# Patient Record
Sex: FEMALE | Race: WHITE | Employment: UNEMPLOYED | URBAN - METROPOLITAN AREA
[De-identification: names, ages, dates, MRNs, and addresses within clinical notes are randomized per-mention and may not be internally consistent; named-entity substitution may affect disease eponyms.]

---

## 2017-08-19 ENCOUNTER — HOSPITAL ENCOUNTER (EMERGENCY)
Age: 32
Discharge: HOME | End: 2017-08-19
Attending: EMERGENCY MEDICINE
Payer: COMMERCIAL

## 2017-08-19 VITALS
HEART RATE: 78 BPM | SYSTOLIC BLOOD PRESSURE: 128 MMHG | OXYGEN SATURATION: 99 % | DIASTOLIC BLOOD PRESSURE: 72 MMHG | RESPIRATION RATE: 23 BRPM | WEIGHT: 276 LBS | TEMPERATURE: 98.6 F | HEIGHT: 67 IN | BODY MASS INDEX: 43.32 KG/M2

## 2017-08-19 DIAGNOSIS — T43.615A CAFFEINE ADVERSE REACTION, INITIAL ENCOUNTER: Primary | ICD-10-CM

## 2017-08-19 LAB
ANION GAP BLD CALC-SCNC: 15 MMOL/L (ref 5–15)
BUN BLD-MCNC: 17 MG/DL (ref 9–20)
CA-I BLD-MCNC: 1.11 MMOL/L (ref 1.12–1.32)
CHLORIDE BLD-SCNC: 105 MMOL/L (ref 98–107)
CO2 BLD-SCNC: 23 MMOL/L (ref 21–32)
CREAT BLD-MCNC: 0.6 MG/DL (ref 0.6–1.3)
GLUCOSE BLD-MCNC: 165 MG/DL (ref 65–100)
HCT VFR BLD CALC: 40 % (ref 35–47)
HGB BLD-MCNC: 13.6 GM/DL (ref 11.5–16)
POTASSIUM BLD-SCNC: 3.6 MMOL/L (ref 3.5–5.1)
SERVICE CMNT-IMP: ABNORMAL
SODIUM BLD-SCNC: 138 MMOL/L (ref 136–145)

## 2017-08-19 PROCEDURE — 80047 BASIC METABLC PNL IONIZED CA: CPT

## 2017-08-19 PROCEDURE — 74011250636 HC RX REV CODE- 250/636: Performed by: EMERGENCY MEDICINE

## 2017-08-19 PROCEDURE — 93005 ELECTROCARDIOGRAM TRACING: CPT

## 2017-08-19 PROCEDURE — 99284 EMERGENCY DEPT VISIT MOD MDM: CPT

## 2017-08-19 RX ADMIN — SODIUM CHLORIDE 1000 ML: 900 INJECTION, SOLUTION INTRAVENOUS at 08:51

## 2017-08-19 NOTE — ED TRIAGE NOTES
Pt presents to ED with c/o heart racing since yesterday. Pt states she drank two Monsters and has not felt well since. Pt states some indigestion but denies any chest pain at present.

## 2017-08-19 NOTE — LETTER
NOTIFICATION RETURN TO WORK / SCHOOL 
 
8/19/2017 9:11 AM 
 
Ms. Andrew Tyler [address]  [address] To Whom It May Concern: 
 
Andrew Tyler is currently under the care of [facility] EMERGENCY DEPT. Accompanied by her  She will return to work/school on: 8/19/17 If there are questions or concerns please have the patient contact our office. Sincerely, Xiomara Guo.  Rangel Morales MD

## 2017-08-19 NOTE — ED PROVIDER NOTES
HPI Comments: 33 y.o. female with no significant past medical history who presents from home with chief complaint of heart racing. Had 2 energy drinks last night and has had little sleep for the past 2 nights. States that she just does not feel right. Denies chest pain or SOB. Denies leg swelling, h/o DVT/PE, estrogen/ OCP use, recent surgeries. Denies nausea or vomiting. There are no other acute medical concerns at this time. Social hx: former smoker    PCP: Louie Page MD        The history is provided by the patient. Past Medical History:   Diagnosis Date    Anxiety     Depression     Fluid retention     Hypotension     Insulin resistance     Obstructive sleep apnea, adult     cpap    PCOS (polycystic ovarian syndrome)        Past Surgical History:   Procedure Laterality Date    HX DILATION AND CURETTAGE      s/p miscarriage 2004    HX GYN      IUD placement         Family History:   Problem Relation Age of Onset    Asthma Mother     Heart Disease Mother      Afib    Cancer Mother      colon    Asthma Father     Asthma Sister     Asthma Brother        Social History     Social History    Marital status:      Spouse name: N/A    Number of children: N/A    Years of education: N/A     Occupational History    Not on file. Social History Main Topics    Smoking status: Former Smoker    Smokeless tobacco: Never Used    Alcohol use Yes      Comment: Rarely used.  Drug use: No    Sexual activity: Yes     Partners: Male     Other Topics Concern    Not on file     Social History Narrative         ALLERGIES: Percocet [oxycodone-acetaminophen]    Review of Systems   Constitutional: Negative for chills and fever. HENT: Negative for ear pain and sore throat. Eyes: Negative for pain. Respiratory: Negative for chest tightness and shortness of breath. Cardiovascular: Negative for chest pain and leg swelling.    Gastrointestinal: Negative for abdominal pain, nausea and vomiting. Genitourinary: Negative for dysuria and flank pain. Musculoskeletal: Negative for back pain. Skin: Negative for rash. Neurological: Negative for headaches. All other systems reviewed and are negative. There were no vitals filed for this visit. Physical Exam   Constitutional: She is oriented to person, place, and time. She appears well-developed and well-nourished. HENT:   Head: Normocephalic and atraumatic. Mouth/Throat: Oropharynx is clear and moist.   Eyes: Conjunctivae and EOM are normal. Pupils are equal, round, and reactive to light. No scleral icterus. Neck: Neck supple. No tracheal deviation present. Cardiovascular: Normal rate, regular rhythm, normal heart sounds and intact distal pulses. Pulmonary/Chest: Effort normal and breath sounds normal. No respiratory distress. Abdominal: Soft. She exhibits no distension. There is no tenderness. There is no rebound and no guarding. Genitourinary:   Genitourinary Comments: deferred   Musculoskeletal: She exhibits no edema. Neurological: She is alert and oriented to person, place, and time. Skin: Skin is warm and dry. Psychiatric: She has a normal mood and affect. Nursing note and vitals reviewed. MDM  Number of Diagnoses or Management Options  Caffeine adverse reaction, initial encounter:   Diagnosis management comments: A/P: 33year old female presents with palpitations with diff dx of caffeine toxicity, electrolyte abnormality, anemia. Normal Vitals. - PERC rule negative  - Chem 8- normal electrolytes and hgb  - IVF  - EKG normal  - Follow up with PCP  - Return to the emergency department for new/ worsening symptoms or other concerns       ED Course     ED EKG interpretation:8:29 AM  Rhythm: normal sinus rhythm; and regular . Rate (approx.): 100;  Axis: normal; P wave: normal; QRS interval: normal ; ST/T wave: normal; Other findings: normal.       Procedures

## 2017-08-19 NOTE — DISCHARGE INSTRUCTIONS
Accidental Overdose of Medicine: Care Instructions  Your Care Instructions  Almost any medicine can cause harm if you take too much of it. You have had treatment to help your body get rid of an overdose of a medicine. This may have been an over-the-counter medicine. Or it might be one that a doctor prescribed. It may even have been a vitamin or a supplement. During treatment, the doctor may have given you fluids and medicine. You also may have had lab tests. Then the doctor made sure that you were well enough to go home. The doctor has checked you carefully, but problems can develop later. If you notice any problems or new symptoms, get medical treatment right away. Follow-up care is a key part of your treatment and safety. Be sure to make and go to all appointments, and call your doctor if you are having problems. It's also a good idea to know your test results and keep a list of the medicines you take. How can you care for yourself at home? Home care  · Drink plenty of fluids. If you have kidney, heart, or liver disease and have to limit fluids, talk with your doctor before you increase the amount of fluids you drink. · If you normally take medicines, ask your doctor when you can start taking them again. · Read the information that comes with any medicine. If you have questions, ask your doctor or pharmacist.  Prevention  · Be safe with medicines. Take your medicines exactly as prescribed or as the label directs. Call your doctor if you think you are having a problem with your medicine. · Keep your medicines in the containers they came in. Keep them with the original labels. · Find out what to do if you miss a dose of your medicine. When should you call for help? Call 911 anytime you think you may need emergency care. For example, call if:  · You passed out (lost consciousness). · You have trouble breathing. · You are sleepy or hard to wake up.   Call your doctor now or seek immediate medical care if:  · You are vomiting. · You have a new or worse headache. · You are dizzy or lightheaded or feel like you may faint. Watch closely for changes in your health, and be sure to contact your doctor if:  · You do not get better as expected. Where can you learn more? Go to http://alisha-ximena.info/. Enter C165 in the search box to learn more about \"Accidental Overdose of Medicine: Care Instructions. \"  Current as of: March 24, 2017  Content Version: 11.3  © 1215-6687 [employer]. Care instructions adapted under license by [employer] (which disclaims liability or warranty for this information). If you have questions about a medical condition or this instruction, always ask your healthcare professional. [address] any warranty or liability for your use of this information. We hope that we have addressed all of your medical concerns. The examination and treatment you received in the Emergency Department were for an emergent problem and were not intended as complete care. It is important that you follow up with your healthcare provider(s) for ongoing care. If your symptoms worsen or do not improve as expected, and you are unable to reach your usual health care provider(s), you should return to the Emergency Department. Today's healthcare is undergoing tremendous change, and patient satisfaction surveys are one of the many tools to assess the quality of medical care. You may receive a survey from the [employer] regarding your experience in the Emergency Department. I hope that your experience has been completely positive, particularly the medical care that I provided. As such, please participate in the survey; anything less than excellent does not meet my expectations or intentions. [facility] and 8 [facility] participate in nationally recognized quality of care measures.   If your blood pressure is greater than 120/80, as reported below, we urge that you seek medical care to address the potential of high blood pressure, commonly known as hypertension. Hypertension can be hereditary or can be caused by certain medical conditions, pain, stress, or \"white coat syndrome. \"       Please make an appointment with your health care provider(s) for follow up of your Emergency Department visit. VITALS:   Patient Vitals for the past 8 hrs:   Temp Pulse Resp BP SpO2   08/19/17 0828 98.6 °F (37 °C) 78 15 134/73 99 %          Thank you for allowing us to provide you with medical care today. We realize that you have many choices for your emergency care needs. Please choose us in the future for any continued health care needs. Godfrey Yadav, [address] [place] [address] 20.   Office: [phone]            Recent Results (from the past 24 hour(s))   EKG, 12 LEAD, INITIAL    Collection Time: 08/19/17  8:27 AM   Result Value Ref Range    Ventricular Rate 100 BPM    Atrial Rate 100 BPM    P-R Interval 164 ms    QRS Duration 88 ms    Q-T Interval 364 ms    QTC Calculation (Bezet) 469 ms    Calculated P Axis 42 degrees    Calculated R Axis -10 degrees    Calculated T Axis 3 degrees    Diagnosis       Normal sinus rhythm  Normal ECG  When compared with ECG of 03-JUL-2016 18:07,  No significant change was found     POC CHEM8    Collection Time: 08/19/17  8:49 AM   Result Value Ref Range    Calcium, ionized (POC) 1.11 (L) 1.12 - 1.32 MMOL/L    Sodium (POC) 138 136 - 145 MMOL/L    Potassium (POC) 3.6 3.5 - 5.1 MMOL/L    Chloride (POC) 105 98 - 107 MMOL/L    CO2 (POC) 23 21 - 32 MMOL/L    Anion gap (POC) 15 5 - 15 mmol/L    Glucose (POC) 165 (H) 65 - 100 MG/DL    BUN (POC) 17 9 - 20 MG/DL    Creatinine (POC) 0.6 0.6 - 1.3 MG/DL    GFRAA, POC >60 >60 ml/min/1.73m2    GFRNA, POC >60 >60 ml/min/1.73m2    Hemoglobin (POC) 13.6 11.5 - 16.0 GM/DL    Hematocrit (POC) 40 35.0 - 47.0 %    Comment Comment Not Indicated. No results found.

## 2017-08-20 LAB
ATRIAL RATE: 100 BPM
CALCULATED P AXIS, ECG09: 42 DEGREES
CALCULATED R AXIS, ECG10: -10 DEGREES
CALCULATED T AXIS, ECG11: 3 DEGREES
DIAGNOSIS, 93000: NORMAL
P-R INTERVAL, ECG05: 164 MS
Q-T INTERVAL, ECG07: 364 MS
QRS DURATION, ECG06: 88 MS
QTC CALCULATION (BEZET), ECG08: 469 MS
VENTRICULAR RATE, ECG03: 100 BPM

## 2018-03-22 ENCOUNTER — OFFICE VISIT (OUTPATIENT)
Dept: SURGERY | Age: 33
End: 2018-03-22

## 2018-03-22 DIAGNOSIS — E66.01 MORBID OBESITY WITH BMI OF 40.0-44.9, ADULT (HCC): Primary | ICD-10-CM

## 2018-03-22 DIAGNOSIS — E28.2 PCOS (POLYCYSTIC OVARIAN SYNDROME): ICD-10-CM

## 2018-03-22 RX ORDER — ACYCLOVIR 400 MG/1
TABLET ORAL
COMMUNITY
Start: 2017-04-15 | End: 2019-08-18

## 2018-03-22 RX ORDER — ALBUTEROL SULFATE 90 UG/1
AEROSOL, METERED RESPIRATORY (INHALATION)
COMMUNITY
End: 2019-08-18

## 2018-03-22 RX ORDER — FUROSEMIDE 20 MG/1
TABLET ORAL
COMMUNITY

## 2018-03-22 NOTE — MR AVS SNAPSHOT
[address] [place] [facility] [address] 
[phone] Patient: Carolyne Bloch MRN: [id number] :1985 Visit Information Date & Time Provider Department Dept. Phone Encounter #  
 3/22/2018  2:00 PM Rojelio Rush NP [facility] 22 [phone] [id number] Upcoming Health Maintenance Date Due DTaP/Tdap/Td series (1 - Tdap) 10/27/2004 PAP AKA CERVICAL CYTOLOGY 10/27/2004 Influenza Age 5 to Adult 2017 Allergies as of 3/22/2018  Review Complete On: 3/22/2018 By: Rojelio Rush NP Severity Noted Reaction Type Reactions Ciprofloxacin High 2018    Anaphylaxis Percocet [Oxycodone-acetaminophen]  2013    Itching Prozac [Fluoxetine]  2018    Other (comments) BP goes high and migraine for 36 hours. Current Immunizations  Never Reviewed No immunizations on file. Not reviewed this visit You Were Diagnosed With   
  
 Codes Comments Morbid obesity with BMI of 40.0-44.9, adult ([facility]    -  Primary ICD-10-CM: E66.01, Z68.41 
ICD-9-CM: 278.01, V85.41 Vitals BP Pulse Temp Resp Height(growth percentile) Weight(growth percentile) 116/70 78 98.8 °F (37.1 °C) (Oral) 20 5' 7\" (1.702 m) 286 lb 12.8 oz (130.1 kg) SpO2 BMI OB Status Smoking Status 98% 44.92 kg/m2 Having regular periods Former Smoker Vitals History BMI and BSA Data Body Mass Index Body Surface Area 44.92 kg/m 2 2.48 m 2 Preferred Pharmacy Pharmacy Name Phone [address], [address] [phone] Your Updated Medication List  
  
   
This list is accurate as of 3/22/18  3:28 PM.  Always use your most recent med list.  
  
  
  
  
 acyclovir 400 mg tablet Commonly known as:  ZOVIRAX  
  
 albuterol 90 mcg/actuation inhaler Commonly known as:  PROVENTIL HFA, VENTOLIN HFA, PROAIR HFA  
 Take  by inhalation. biotin 2,500 mcg Tab Take 5,000 mg by mouth. EFFEXOR XR PO Take 150 mg by mouth. furosemide 20 mg tablet Commonly known as:  LASIX Take 20 mg by mouth daily. metFORMIN 1,000 mg tablet Commonly known as:  GLUCOPHAGE Take 1,000 mg by mouth two (2) times daily (with meals). POTASSIUM PO Take 10 mEq by mouth. spironolactone 50 mg tablet Commonly known as:  ALDACTONE Take  by mouth daily. traZODone 50 mg tablet Commonly known as:  Wendy Artis Patient Instructions Shanon Christianson will contact you about the dietician You can schedule the psychological evaluation at any time Learning About How to Prepare for [address] [place] [address] Surgery How can you prepare for weight-loss surgery? Having weight-loss surgery (also called bariatric surgery) is a big step. You can prepare for surgery by having a plan. Your plan may include your goals for losing weight and how to makes changes in your diet, activity, and lifestyle to help raise your chances of success. One way to prepare for surgery is to think about your goal or reason why you want to reach a healthy weight. Do you want to lower your blood pressure, cholesterol, or blood sugar? Do you want to be able to sleep better, play with your kids, or walk around the block? Having a reason can help you stay with your plan and meet your goals. Your weight-loss surgery team can help you meet your goals and get ready for surgery. Bonnie Toledo work with a team that's trained to help you lose weight and make healthy changes in your life. This team may include: · A medical doctor or nurse to help manage your care and schedule tests before surgery. · A surgeon who specializes in weight-loss surgery. · A registered dietitian to help you plan meals and make changes in the way you eat. · An exercise specialist to help you be more active and get stronger. · A therapist or counselor to help you learn why you eat and teach you ways to deal with stress and your emotions. Your team will also be there to help you prepare for life after surgery. They will help you adjust to new ways of eating and changes to your body. How will weight-loss surgery affect your life? You have likely thought a lot about how surgery may affect your life-how you will eat, how your body will look, or how you will feel. Some people feel overwhelmed with these changes. But planning can help you prepare for the changes and meet your weight-loss goals. One important step in your plan is to learn about the ways surgery will affect your life. These may include: · A slimmer you. You probably will lose weight very quickly in the first few months after surgery. As time goes on, your weight loss will slow down. How much weight you lose depends on what type of surgery you had and how well your new eating and activity plans are working for you. · A new way of eating. Success in reaching and keeping a healthy weight depends on making lifelong changes in how you eat. After surgery, you raise your chances of success if you: 
¨ Eat just a few ounces of food at a time. ¨ Eat very slowly and chew your food to mush. ¨ Don't drink for 30 minutes before you eat, during your meal, and for 30 minutes after you eat. ¨ Are careful about drinking alcohol. ¨ Avoid foods that are high in fat or sugar. ¨ Take vitamin and mineral supplements. · A healthier you. Weight-loss surgery can have some real health benefits. Problems like diabetes, high blood pressure, and sleep apnea may go away-or at least become easier to manage. · A more active you. After surgery, being active on most days of the week will help you reach your weight goal and avoid gaining back the weight you lose. · A lot of extra skin.  When you lose weight quickly, you may have a lot of extra skin. That's normal. You can have surgery to remove the extra skin if it bothers you. There are going to be some ups and downs while you get used to these changes. So another way to adjust is to identify who can help support you. Getting support from friends and family can help. And joining a support group for people who have had the surgery can be a big help too, because they know what you're going through. As you know, it's a big decision to have weight-loss surgery. But when you have a plan, you can focus on losing weight and living a healthier life. So what steps can you take to prepare for weight-loss surgery? Will you set some goals? Will you learn about how surgery can affect your life? How about asking family or friends for help? Write out your plan. Then get ready. Where can you learn more? Go to http://alishaZeenshareximena.info/. Enter B133 in the search box to learn more about \"Learning About How to Prepare for Weight-Loss Surgery. \" Current as of: October 13, 2016 Content Version: 11.4 © 4168-4878 [employer]. Care instructions adapted under license by [employer] (which disclaims liability or warranty for this information). If you have questions about a medical condition or this instruction, always ask your healthcare professional. [address] any warranty or liability for your use of this information. Introducing [facility] [place] [facility]! Dear Rigo Range: Thank you for requesting a GROU.PS account. Our records indicate that you already have an active GROU.PS account. You can access your account anytime at [email or web address]. [employer] Did you know that you can access your hospital and ER discharge instructions at any time in GROU.PS? You can also review all of your test results from your hospital stay or ER visit. Additional Information If you have questions, please visit the Frequently Asked Questions section of the MovieLaLa website at [email or web address]. The Smartphone Physical/mychart/. Remember, MovieLaLa is NOT to be used for urgent needs. For medical emergencies, dial 911. Now available from your iPhone and Android! Please provide this summary of care documentation to your next provider. Your primary care clinician is listed as Gustavo Nair. If you have any questions after today's visit, please call [phone].

## 2018-03-22 NOTE — PATIENT INSTRUCTIONS
Lurdes Vines will contact you about the dietician     You can schedule the psychological evaluation at any time          Learning About How to Prepare for Weight-Loss Surgery  How can you prepare for weight-loss surgery? Having weight-loss surgery (also called bariatric surgery) is a big step. You can prepare for surgery by having a plan. Your plan may include your goals for losing weight and how to makes changes in your diet, activity, and lifestyle to help raise your chances of success. One way to prepare for surgery is to think about your goal or reason why you want to reach a healthy weight. Do you want to lower your blood pressure, cholesterol, or blood sugar? Do you want to be able to sleep better, play with your kids, or walk around the block? Having a reason can help you stay with your plan and meet your goals. Your weight-loss surgery team can help you meet your goals and get ready for surgery. Hank Kulkarni work with a team that's trained to help you lose weight and make healthy changes in your life. This team may include:  · A medical doctor or nurse to help manage your care and schedule tests before surgery. · A surgeon who specializes in weight-loss surgery. · A registered dietitian to help you plan meals and make changes in the way you eat. · An exercise specialist to help you be more active and get stronger. · A therapist or counselor to help you learn why you eat and teach you ways to deal with stress and your emotions. Your team will also be there to help you prepare for life after surgery. They will help you adjust to new ways of eating and changes to your body. How will weight-loss surgery affect your life? You have likely thought a lot about how surgery may affect your life-how you will eat, how your body will look, or how you will feel. Some people feel overwhelmed with these changes. But planning can help you prepare for the changes and meet your weight-loss goals.   One important step in your plan is to learn about the ways surgery will affect your life. These may include:  · A slimmer you. You probably will lose weight very quickly in the first few months after surgery. As time goes on, your weight loss will slow down. How much weight you lose depends on what type of surgery you had and how well your new eating and activity plans are working for you. · A new way of eating. Success in reaching and keeping a healthy weight depends on making lifelong changes in how you eat. After surgery, you raise your chances of success if you:  ¨ Eat just a few ounces of food at a time. ¨ Eat very slowly and chew your food to mush. ¨ Don't drink for 30 minutes before you eat, during your meal, and for 30 minutes after you eat. ¨ Are careful about drinking alcohol. ¨ Avoid foods that are high in fat or sugar. ¨ Take vitamin and mineral supplements. · A healthier you. Weight-loss surgery can have some real health benefits. Problems like diabetes, high blood pressure, and sleep apnea may go away-or at least become easier to manage. · A more active you. After surgery, being active on most days of the week will help you reach your weight goal and avoid gaining back the weight you lose. · A lot of extra skin. When you lose weight quickly, you may have a lot of extra skin. That's normal. You can have surgery to remove the extra skin if it bothers you. There are going to be some ups and downs while you get used to these changes. So another way to adjust is to identify who can help support you. Getting support from friends and family can help. And joining a support group for people who have had the surgery can be a big help too, because they know what you're going through. As you know, it's a big decision to have weight-loss surgery. But when you have a plan, you can focus on losing weight and living a healthier life. So what steps can you take to prepare for weight-loss surgery? Will you set some goals?  Will you learn about how surgery can affect your life? How about asking family or friends for help? Write out your plan. Then get ready. Where can you learn more? Go to http://alisha-ximena.info/. Enter G634 in the search box to learn more about \"Learning About How to Prepare for Weight-Loss Surgery. \"  Current as of: October 13, 2016  Content Version: 11.4  © 8663-6488 [employer]wise, [employer]. Care instructions adapted under license by [employer] (which disclaims liability or warranty for this information). If you have questions about a medical condition or this instruction, always ask your healthcare professional. [address] any warranty or liability for your use of this information.

## 2018-03-22 NOTE — PROGRESS NOTES
1. Have you been to the ER, urgent care clinic since your last visit? Hospitalized since your last visit? No    2. Have you seen or consulted any other health care providers outside of the [address] [place] [address] since your last visit? Include any pap smears or colon screening. No       Obdulia Shaffer  Body composition    female  27 y.o. Vitals:    03/22/18 1410   Weight: 286 lb 12.8 oz (130.1 kg)   Height: 5' 7\" (1.702 m)     Body mass index is 44.92 kg/(m^2). Zoila Frisco Neck- 16 inches  Waist-49.5 inches  Hips-60 inches  Frame size-6 medium

## 2018-03-22 NOTE — PROGRESS NOTES
HISTORY OF PRESENT ILLNESS  Haley Bucio is a 27 y.o. female. HPI   Chief Complaint   Patient presents with    Morbid Obesity     Bariatric patient who wants to discuss gastric bypass surgery by Dr. Celine Branham. Haley Bucio is a 27 y.o. female that presents today for evaluation for weight loss surgery. Patient has been overweight most of her life and worsened in high school with the PCOS. High weight 309 lbs last year and adult low weight 250 lbs at [facility] graduation. She has attended an informational seminar and viewed the on - line seminar. She would like to be considered for Malabsorptive gastric bypass for treatment of morbid obesity with Jered Sosa MD.       Body mass index is 47.93 kg/(m^2).       Dietary Habits: 3 meals per day, struggles with hunger; drinks mostly water with crystal lite; eats out 2-3x/ week (recently cut back)     Prior weight loss attempts: Keto diet and lost 35 lbs, Atmary, My Fitness Pal, Belviq, phentermine   Patient Active Problem List   Diagnosis Code    Obesity, morbid ([facility] [address] E66.01    PCOS (polycystic ovarian syndrome) E28.2     Past Medical History:   Diagnosis Date    Anxiety     Asthma     Depression     Fluid retention     GERD (gastroesophageal reflux disease)     Hypotension     Insulin resistance     Morbid obesity (HCC)     Obstructive sleep apnea, adult     cpap    PCOS (polycystic ovarian syndrome)      Past Surgical History:   Procedure Laterality Date    HX DILATION AND CURETTAGE      s/p miscarriage 2004    HX GYN      IUD placement    HX HEENT      wisdom teeth    HX KNEE ARTHROSCOPY Bilateral      Social History     Social History    Marital status:      Spouse name: N/A    Number of children: 4    Years of education: N/A     Occupational History    corrections       Social History Main Topics    Smoking status: Former Smoker     Types: Cigarettes     Quit date: 2010    Smokeless tobacco: Never Used    Alcohol use Yes Comment: Rarely used.  Drug use: No    Sexual activity: Yes     Partners: Male     Birth control/ protection: Surgical      Comment: vasectomy      Other Topics Concern    Not on file     Social History Narrative    In the home with 4 children ages 13-11     Spouse in the home     All well      Family History   Problem Relation Age of Onset    Asthma Mother     Heart Disease Mother      Afib    Cancer Mother      colon    Asthma Father     Asthma Sister     Asthma Brother     Headache Paternal Uncle     Diabetes Maternal Grandfather     Stroke Maternal Grandfather     Diabetes Paternal Alona Gregory     Stroke Paternal Grandfather        Current Outpatient Prescriptions:     albuterol (PROVENTIL HFA, VENTOLIN HFA, PROAIR HFA) 90 mcg/actuation inhaler, Take  by inhalation. , Disp: , Rfl:     furosemide (LASIX) 20 mg tablet, Take 20 mg by mouth daily. , Disp: , Rfl:     acyclovir (ZOVIRAX) 400 mg tablet, , Disp: , Rfl:     metFORMIN (GLUCOPHAGE) 1,000 mg tablet, Take 1,000 mg by mouth two (2) times daily (with meals). , Disp: , Rfl:     spironolactone (ALDACTONE) 50 mg tablet, Take  by mouth daily. , Disp: , Rfl:     biotin 2,500 mcg tab, Take 5,000 mg by mouth., Disp: , Rfl:     POTASSIUM PO, Take 10 mEq by mouth., Disp: , Rfl:     VENLAFAXINE HCL (EFFEXOR XR PO), Take 150 mg by mouth., Disp: , Rfl:     traZODone (DESYREL) 50 mg tablet, , Disp: , Rfl:   Allergies   Allergen Reactions    Ciprofloxacin Anaphylaxis    Percocet [Oxycodone-Acetaminophen] Itching    Prozac [Fluoxetine] Other (comments)     BP goes high and migraine for 36 hours. PCP Lorena Marquez MD      Review of Systems   Constitutional: Positive for malaise/fatigue (push myself to get through the day ). \"I'm all or none\"   HENT: Positive for congestion (seasonal allergies ). Negative for ear discharge, hearing loss, sinus pain, sore throat and tinnitus. Eyes: Positive for blurred vision (contacts ).    Respiratory: Positive for shortness of breath and wheezing (hx asthma with recent flair ). Negative for cough, hemoptysis and sputum production.         + ANNA and uses CPAP      Cardiovascular: Positive for leg swelling (better with lasix ). Negative for chest pain and palpitations. Hx cardiogenic hypotension      Gastrointestinal: Positive for constipation (hemorrhoids ) and heartburn (breakthrough with zantac). Negative for abdominal pain, blood in stool, diarrhea, melena, nausea and vomiting. Genitourinary: Positive for urgency. Negative for dysuria, flank pain, frequency and hematuria. Musculoskeletal: Positive for back pain (no sciatica ), joint pain (knees), myalgias and neck pain (muscles ). Negative for falls. Torn meniscus bilateral knees and was told needs knee replacements    Skin:        Eczema   rosacea    Neurological: Positive for tingling (fingers ), seizures (febrile seizure as child ) and headaches (weekly ). Negative for dizziness. Endo/Heme/Allergies: Does not bruise/bleed easily. PCOS      Psychiatric/Behavioral: Positive for depression. The patient does not have insomnia. Physical Exam   Constitutional: She is oriented to person, place, and time. /70  Pulse 78  Temp 98.8 °F (37.1 °C) (Oral)   Resp 20  Ht 5' 7\" (1.702 m)  Wt 306 lb (138.8 kg)  SpO2 98%  BMI 47.93 kg/m2    White female with central obesity    Cardiovascular: Normal rate and regular rhythm. Pulmonary/Chest: Effort normal and breath sounds normal.   Abdominal: Soft. Obese    Musculoskeletal:   Ambulating independently    Neurological: She is alert and oriented to person, place, and time. Psychiatric: She has a normal mood and affect. ASSESSMENT and PLAN    ICD-10-CM ICD-9-CM    1. Morbid obesity with BMI of 40.0-44.9, adult ([facility] E66.01 278.01     Z68.41 V85.41    2. PCOS (polycystic ovarian syndrome) E28.2 256.4      Obdulia Shaffer meets criteria established by the NIH.  Without weight reduction, co-morbidities will escalate as well as risk of early mortality. Recommendation is patient could be served with surgical weight reduction, the procedure of Malabsorptive gastric bypass for treatment of morbid obesity . I explained to the patient differences between laparoscopic  gastric bypass and sleeve gastrectomy procedures. Patient has attended one our informational meeting and has seen our educational materials. Patient desires to have surgery with Syed Caro MD.   The procedure of divided gastric bypass with Randy-en-Y gastrojejunostomy was explained to the patient including the four parts of the operation- 1) loss of appetite, 2) the restrictive phases, 3) the dumping phase, 4) mild malabsorption from the diversion of pancreatic or biliary enzymes. Informed consent was obtained concerning the potential morbidities and mortality of the operation including anastomotic leak, pulmonary embolism, deep vein thrombosis, bleeding, staple- line disruption, stomal stenosis or dilatation, inadequate weight loss, and requirement for life-long vitamin intake. Further information was given concerning a three-day hospitalization with at least one or more nights in a special care unit, protein- enriched liquified diet for two-thee weeks, convalescence for three to six weeks. Information was provided concerning the team approach anesthesia, nursing, and dietary. Pneumatic stockings, Heparin or Lovenox, and wound care management techniques were explained. Abdominal pain, nausea and/or vomiting may occur if eating too fast, too much, or not chewing well enough. With sweets or high fat ingestion, dumping may occur. It was further stressed the approximately three to five percent of patients require endoscopic balloon dilatation of the staple line.  Furthermore, a clear discussion of the imperfections of the operation was discussed including the fact that it not effective in every patient because of patient non-compliance and/or mechanical failure. It was hoped, however, that at approaching a ninety percent level, the patients can achieve a mean of seventy percent loss of excess body weight. This is determined partially by patient compliance and exercise as well as making wise choices for the diet. Recommendations:    _x__ Nutrition Evaluation    _x__ Psychological Evaluation    ___ Cardiac Evaluation    ___ Pulmonary Evaluation    ___ Sleep Medicine     ___ [facility]     ___ Committee    I have reinforced without lifestyle change and behavior modification, Ketty Oviedo would not achieve her weight loss goals. I reviewed risks and complications associated with each procedure. I discussed with patient a diet high in protein, low-fat, low- sugar, limited carbohydrates, and discontinuing use of carbonated beverages. Also discussed physical activity and exercises. I have answered all questions, they wish to proceed. Ketty Oviedo verbalized understanding and questions were answered to the best of my knowledge and ability. Bariatric surgery  educational materials were provided. 56 minutes spent in face to face with Obdulia Shaffer > 50% counseling.       ** Copy to PCP Ashley Leslie MD

## 2018-03-23 ENCOUNTER — TELEPHONE (OUTPATIENT)
Dept: SURGERY | Age: 33
End: 2018-03-23

## 2018-03-23 VITALS
HEART RATE: 78 BPM | DIASTOLIC BLOOD PRESSURE: 70 MMHG | WEIGHT: 293 LBS | RESPIRATION RATE: 20 BRPM | OXYGEN SATURATION: 98 % | SYSTOLIC BLOOD PRESSURE: 116 MMHG | BODY MASS INDEX: 45.99 KG/M2 | HEIGHT: 67 IN | TEMPERATURE: 98.8 F

## 2018-03-23 PROBLEM — E28.2 PCOS (POLYCYSTIC OVARIAN SYNDROME): Status: ACTIVE | Noted: 2018-03-23

## 2019-08-18 ENCOUNTER — HOSPITAL ENCOUNTER (EMERGENCY)
Age: 34
Discharge: HOME | End: 2019-08-18
Attending: EMERGENCY MEDICINE
Payer: COMMERCIAL

## 2019-08-18 ENCOUNTER — APPOINTMENT (OUTPATIENT)
Dept: ULTRASOUND IMAGING | Age: 34
End: 2019-08-18
Attending: EMERGENCY MEDICINE
Payer: COMMERCIAL

## 2019-08-18 ENCOUNTER — APPOINTMENT (OUTPATIENT)
Dept: CT IMAGING | Age: 34
End: 2019-08-18
Attending: EMERGENCY MEDICINE
Payer: COMMERCIAL

## 2019-08-18 ENCOUNTER — HOSPITAL ENCOUNTER (EMERGENCY)
Age: 34
Discharge: HOME | End: 2019-08-19
Attending: EMERGENCY MEDICINE
Payer: COMMERCIAL

## 2019-08-18 VITALS
TEMPERATURE: 97.8 F | OXYGEN SATURATION: 99 % | BODY MASS INDEX: 45.99 KG/M2 | HEIGHT: 67 IN | SYSTOLIC BLOOD PRESSURE: 111 MMHG | DIASTOLIC BLOOD PRESSURE: 59 MMHG | WEIGHT: 293 LBS | RESPIRATION RATE: 16 BRPM | HEART RATE: 70 BPM

## 2019-08-18 DIAGNOSIS — K29.90 GASTRITIS AND DUODENITIS: ICD-10-CM

## 2019-08-18 DIAGNOSIS — R10.13 ABDOMINAL PAIN, EPIGASTRIC: Primary | ICD-10-CM

## 2019-08-18 DIAGNOSIS — M54.50 ACUTE BILATERAL LOW BACK PAIN WITHOUT SCIATICA: ICD-10-CM

## 2019-08-18 DIAGNOSIS — M54.50 ACUTE BILATERAL LOW BACK PAIN WITHOUT SCIATICA: Primary | ICD-10-CM

## 2019-08-18 LAB
ALBUMIN SERPL-MCNC: 3.8 G/DL (ref 3.5–5)
ALBUMIN/GLOB SERPL: 1 {RATIO} (ref 1.1–2.2)
ALP SERPL-CCNC: 82 U/L (ref 45–117)
ALT SERPL-CCNC: 16 U/L (ref 12–78)
AMORPH CRY URNS QL MICRO: ABNORMAL
ANION GAP SERPL CALC-SCNC: 9 MMOL/L (ref 5–15)
APPEARANCE UR: CLEAR
AST SERPL-CCNC: 9 U/L (ref 15–37)
BACTERIA URNS QL MICRO: NEGATIVE /HPF
BASOPHILS # BLD: 0 K/UL (ref 0–0.1)
BASOPHILS NFR BLD: 0 % (ref 0–1)
BILIRUB SERPL-MCNC: 0.5 MG/DL (ref 0.2–1)
BILIRUB UR QL: NEGATIVE
BUN SERPL-MCNC: 14 MG/DL (ref 6–20)
BUN/CREAT SERPL: 17 (ref 12–20)
CALCIUM SERPL-MCNC: 9 MG/DL (ref 8.5–10.1)
CHLORIDE SERPL-SCNC: 106 MMOL/L (ref 97–108)
CO2 SERPL-SCNC: 27 MMOL/L (ref 21–32)
COLOR UR: ABNORMAL
CREAT SERPL-MCNC: 0.81 MG/DL (ref 0.55–1.02)
DIFFERENTIAL METHOD BLD: NORMAL
EOSINOPHIL # BLD: 0.1 K/UL (ref 0–0.4)
EOSINOPHIL NFR BLD: 1 % (ref 0–7)
EPITH CASTS URNS QL MICRO: ABNORMAL /LPF
ERYTHROCYTE [DISTWIDTH] IN BLOOD BY AUTOMATED COUNT: 12.4 % (ref 11.5–14.5)
GLOBULIN SER CALC-MCNC: 3.8 G/DL (ref 2–4)
GLUCOSE SERPL-MCNC: 107 MG/DL (ref 65–100)
GLUCOSE UR STRIP.AUTO-MCNC: NEGATIVE MG/DL
HCG UR QL: NEGATIVE
HCT VFR BLD AUTO: 39.1 % (ref 35–47)
HGB BLD-MCNC: 13.5 G/DL (ref 11.5–16)
HGB UR QL STRIP: ABNORMAL
KETONES UR QL STRIP.AUTO: NEGATIVE MG/DL
LEUKOCYTE ESTERASE UR QL STRIP.AUTO: NEGATIVE
LIPASE SERPL-CCNC: 133 U/L (ref 73–393)
LYMPHOCYTES # BLD: 1.9 K/UL (ref 0.8–3.5)
LYMPHOCYTES NFR BLD: 20 % (ref 12–49)
MCH RBC QN AUTO: 30.9 PG (ref 26–34)
MCHC RBC AUTO-ENTMCNC: 34.5 G/DL (ref 30–36.5)
MCV RBC AUTO: 89.5 FL (ref 80–99)
MONOCYTES # BLD: 0.7 K/UL (ref 0–1)
MONOCYTES NFR BLD: 8 % (ref 5–13)
NEUTS SEG # BLD: 6.5 K/UL (ref 1.8–8)
NEUTS SEG NFR BLD: 71 % (ref 32–75)
NITRITE UR QL STRIP.AUTO: NEGATIVE
PH UR STRIP: 8 [PH] (ref 5–8)
PLATELET # BLD AUTO: 253 K/UL (ref 150–400)
PMV BLD AUTO: 10.3 FL (ref 8.9–12.9)
POTASSIUM SERPL-SCNC: 4.2 MMOL/L (ref 3.5–5.1)
PROT SERPL-MCNC: 7.6 G/DL (ref 6.4–8.2)
PROT UR STRIP-MCNC: NEGATIVE MG/DL
RBC # BLD AUTO: 4.37 M/UL (ref 3.8–5.2)
RBC #/AREA URNS HPF: ABNORMAL /HPF (ref 0–5)
SODIUM SERPL-SCNC: 142 MMOL/L (ref 136–145)
SP GR UR REFRACTOMETRY: 1.02 (ref 1–1.03)
UR CULT HOLD, URHOLD: NORMAL
UROBILINOGEN UR QL STRIP.AUTO: 0.2 EU/DL (ref 0.2–1)
WBC # BLD AUTO: 9.2 K/UL (ref 3.6–11)
WBC URNS QL MICRO: ABNORMAL /HPF (ref 0–4)

## 2019-08-18 PROCEDURE — 96361 HYDRATE IV INFUSION ADD-ON: CPT

## 2019-08-18 PROCEDURE — 74176 CT ABD & PELVIS W/O CONTRAST: CPT

## 2019-08-18 PROCEDURE — 96375 TX/PRO/DX INJ NEW DRUG ADDON: CPT

## 2019-08-18 PROCEDURE — 81001 URINALYSIS AUTO W/SCOPE: CPT

## 2019-08-18 PROCEDURE — 99283 EMERGENCY DEPT VISIT LOW MDM: CPT

## 2019-08-18 PROCEDURE — 80053 COMPREHEN METABOLIC PANEL: CPT

## 2019-08-18 PROCEDURE — 96372 THER/PROPH/DIAG INJ SC/IM: CPT

## 2019-08-18 PROCEDURE — 74011000250 HC RX REV CODE- 250: Performed by: EMERGENCY MEDICINE

## 2019-08-18 PROCEDURE — 74011250637 HC RX REV CODE- 250/637: Performed by: EMERGENCY MEDICINE

## 2019-08-18 PROCEDURE — 74011250636 HC RX REV CODE- 250/636: Performed by: EMERGENCY MEDICINE

## 2019-08-18 PROCEDURE — 36415 COLL VENOUS BLD VENIPUNCTURE: CPT

## 2019-08-18 PROCEDURE — 96374 THER/PROPH/DIAG INJ IV PUSH: CPT

## 2019-08-18 PROCEDURE — 85025 COMPLETE CBC W/AUTO DIFF WBC: CPT

## 2019-08-18 PROCEDURE — 99284 EMERGENCY DEPT VISIT MOD MDM: CPT

## 2019-08-18 PROCEDURE — 81025 URINE PREGNANCY TEST: CPT

## 2019-08-18 PROCEDURE — 83690 ASSAY OF LIPASE: CPT

## 2019-08-18 PROCEDURE — 76705 ECHO EXAM OF ABDOMEN: CPT

## 2019-08-18 PROCEDURE — 74011636637 HC RX REV CODE- 636/637: Performed by: EMERGENCY MEDICINE

## 2019-08-18 RX ORDER — ONDANSETRON 4 MG/1
4 TABLET, ORALLY DISINTEGRATING ORAL
Status: COMPLETED | OUTPATIENT
Start: 2019-08-18 | End: 2019-08-18

## 2019-08-18 RX ORDER — PANTOPRAZOLE SODIUM 40 MG/1
40 TABLET, DELAYED RELEASE ORAL DAILY
Qty: 30 TAB | Refills: 0 | Status: SHIPPED | OUTPATIENT
Start: 2019-08-18 | End: 2019-09-17

## 2019-08-18 RX ORDER — PROCHLORPERAZINE EDISYLATE 5 MG/ML
10 INJECTION INTRAMUSCULAR; INTRAVENOUS
Status: COMPLETED | OUTPATIENT
Start: 2019-08-18 | End: 2019-08-18

## 2019-08-18 RX ORDER — LIDOCAINE 4 G/100G
1 PATCH TOPICAL
Status: DISCONTINUED | OUTPATIENT
Start: 2019-08-18 | End: 2019-08-18 | Stop reason: HOSPADM

## 2019-08-18 RX ORDER — HYDROCODONE BITARTRATE AND ACETAMINOPHEN 5; 325 MG/1; MG/1
1 TABLET ORAL
Qty: 10 TAB | Refills: 0 | Status: SHIPPED | OUTPATIENT
Start: 2019-08-18 | End: 2019-08-23

## 2019-08-18 RX ORDER — MORPHINE SULFATE 2 MG/ML
2 INJECTION, SOLUTION INTRAMUSCULAR; INTRAVENOUS
Status: COMPLETED | OUTPATIENT
Start: 2019-08-18 | End: 2019-08-18

## 2019-08-18 RX ORDER — CYCLOBENZAPRINE HCL 10 MG
10 TABLET ORAL
Status: COMPLETED | OUTPATIENT
Start: 2019-08-18 | End: 2019-08-18

## 2019-08-18 RX ORDER — ACETAMINOPHEN 325 MG/1
975 TABLET ORAL
Status: COMPLETED | OUTPATIENT
Start: 2019-08-18 | End: 2019-08-18

## 2019-08-18 RX ORDER — FAMOTIDINE 10 MG/ML
20 INJECTION INTRAVENOUS
Status: COMPLETED | OUTPATIENT
Start: 2019-08-18 | End: 2019-08-18

## 2019-08-18 RX ORDER — PREDNISONE 20 MG/1
60 TABLET ORAL
Status: COMPLETED | OUTPATIENT
Start: 2019-08-18 | End: 2019-08-18

## 2019-08-18 RX ORDER — PREDNISONE 10 MG/1
TABLET ORAL
Qty: 21 TAB | Refills: 0 | Status: SHIPPED | OUTPATIENT
Start: 2019-08-18

## 2019-08-18 RX ORDER — KETOROLAC TROMETHAMINE 30 MG/ML
60 INJECTION, SOLUTION INTRAMUSCULAR; INTRAVENOUS ONCE
Status: COMPLETED | OUTPATIENT
Start: 2019-08-18 | End: 2019-08-18

## 2019-08-18 RX ORDER — ONDANSETRON 2 MG/ML
4 INJECTION INTRAMUSCULAR; INTRAVENOUS
Status: COMPLETED | OUTPATIENT
Start: 2019-08-18 | End: 2019-08-18

## 2019-08-18 RX ORDER — CYCLOBENZAPRINE HCL 10 MG
10 TABLET ORAL
Qty: 20 TAB | Refills: 0 | Status: SHIPPED | OUTPATIENT
Start: 2019-08-18

## 2019-08-18 RX ORDER — ONDANSETRON 4 MG/1
4 TABLET, ORALLY DISINTEGRATING ORAL
Qty: 20 TAB | Refills: 0 | Status: SHIPPED | OUTPATIENT
Start: 2019-08-18

## 2019-08-18 RX ORDER — LIDOCAINE 50 MG/G
PATCH TOPICAL
Qty: 1 PACKAGE | Refills: 1 | Status: SHIPPED | OUTPATIENT
Start: 2019-08-18

## 2019-08-18 RX ORDER — ONDANSETRON 2 MG/ML
4 INJECTION INTRAMUSCULAR; INTRAVENOUS
Status: DISCONTINUED | OUTPATIENT
Start: 2019-08-18 | End: 2019-08-18

## 2019-08-18 RX ADMIN — ACETAMINOPHEN 975 MG: 325 TABLET ORAL at 10:00

## 2019-08-18 RX ADMIN — LIDOCAINE HYDROCHLORIDE 40 ML: 20 SOLUTION ORAL; TOPICAL at 21:18

## 2019-08-18 RX ADMIN — ONDANSETRON 4 MG: 4 TABLET, ORALLY DISINTEGRATING ORAL at 09:58

## 2019-08-18 RX ADMIN — PROCHLORPERAZINE EDISYLATE 10 MG: 5 INJECTION INTRAMUSCULAR; INTRAVENOUS at 21:55

## 2019-08-18 RX ADMIN — CYCLOBENZAPRINE HYDROCHLORIDE 10 MG: 10 TABLET, FILM COATED ORAL at 10:00

## 2019-08-18 RX ADMIN — SODIUM CHLORIDE 1000 ML: 900 INJECTION, SOLUTION INTRAVENOUS at 20:05

## 2019-08-18 RX ADMIN — MORPHINE SULFATE 2 MG: 2 INJECTION, SOLUTION INTRAMUSCULAR; INTRAVENOUS at 21:56

## 2019-08-18 RX ADMIN — ONDANSETRON 4 MG: 4 TABLET, ORALLY DISINTEGRATING ORAL at 21:17

## 2019-08-18 RX ADMIN — METHYLPREDNISOLONE SODIUM SUCCINATE 125 MG: 125 INJECTION, POWDER, FOR SOLUTION INTRAMUSCULAR; INTRAVENOUS at 22:10

## 2019-08-18 RX ADMIN — KETOROLAC TROMETHAMINE 60 MG: 30 INJECTION, SOLUTION INTRAMUSCULAR at 10:01

## 2019-08-18 RX ADMIN — PREDNISONE 60 MG: 20 TABLET ORAL at 21:18

## 2019-08-18 RX ADMIN — ONDANSETRON 4 MG: 2 INJECTION INTRAMUSCULAR; INTRAVENOUS at 20:05

## 2019-08-18 RX ADMIN — FAMOTIDINE 20 MG: 10 INJECTION, SOLUTION INTRAVENOUS at 20:06

## 2019-08-18 NOTE — DISCHARGE INSTRUCTIONS
Patient Education        Learning About Relief for Back Pain  What is back tension and strain? Back strain happens when you overstretch, or pull, a muscle in your back. You may hurt your back in an accident or when you exercise or lift something. Most back pain will get better with rest and time. You can take care of yourself at home to help your back heal.  What can you do first to relieve back pain? When you first feel back pain, try these steps:  · Walk. Take a short walk (10 to 20 minutes) on a level surface (no slopes, hills, or stairs) every 2 to 3 hours. Walk only distances you can manage without pain, especially leg pain. · Relax. Find a comfortable position for rest. Some people are comfortable on the floor or a medium-firm bed with a small pillow under their head and another under their knees. Some people prefer to lie on their side with a pillow between their knees. Don't stay in one position for too long. · Try heat or ice. Try using a heating pad on a low or medium setting, or take a warm shower, for 15 to 20 minutes every 2 to 3 hours. Or you can buy single-use heat wraps that last up to 8 hours. You can also try an ice pack for 10 to 15 minutes every 2 to 3 hours. You can use an ice pack or a bag of frozen vegetables wrapped in a thin towel. There is not strong evidence that either heat or ice will help, but you can try them to see if they help. You may also want to try switching between heat and cold. · Take pain medicine exactly as directed. ¨ If the doctor gave you a prescription medicine for pain, take it as prescribed. ¨ If you are not taking a prescription pain medicine, ask your doctor if you can take an over-the-counter medicine. What else can you do? · Stretch and exercise. Exercises that increase flexibility may relieve your pain and make it easier for your muscles to keep your spine in a good, neutral position. And don't forget to keep walking. · Do self-massage.  You can use self-massage to unwind after work or school or to energize yourself in the morning. You can easily massage your feet, hands, or neck. Self-massage works best if you are in comfortable clothes and are sitting or lying in a comfortable position. Use oil or lotion to massage bare skin. · Reduce stress. Back pain can lead to a vicious [place]: Distress about the pain tenses the muscles in your back, which in turn causes more pain. Learn how to relax your mind and your muscles to lower your stress. Where can you learn more? Go to http://[facility] Medicalximena.info/. Enter Z064 in the search box to learn more about \"Learning About Relief for Back Pain. \"  Current as of: March 21, 2017  Content Version: 11.5  © 5495-5908 [employer]. Care instructions adapted under license by [employer] (which disclaims liability or warranty for this information). If you have questions about a medical condition or this instruction, always ask your healthcare professional. [place] [address] any warranty or liability for your use of this information. Patient Education        Acute Low Back Pain: Exercises  Introduction  Here are some examples of typical rehabilitation exercises for your condition. Start each exercise slowly. Ease off the exercise if you start to have pain. Your doctor or physical therapist will tell you when you can start these exercises and which ones will work best for you. When you are not being active, find a comfortable position for rest. Some people are comfortable on the floor or a medium-firm bed with a small pillow under their head and another under their knees. Some people prefer to lie on their side with a pillow between their knees. Don't stay in one position for too long. Take short walks (10 to 20 minutes) every 2 to 3 hours. Avoid slopes, hills, and stairs until you feel better.  Walk only distances you can manage without pain, especially leg pain.  How to do the exercises  Back stretches    1. Get down on your hands and knees on the floor. 2. Relax your head and allow it to droop. Round your back up toward the ceiling until you feel a nice stretch in your upper, middle, and lower back. Hold this stretch for as long as it feels comfortable, or about 15 to 30 seconds. 3. Return to the starting position with a flat back while you are on your hands and knees. 4. Let your back sway by pressing your stomach toward the floor. Lift your buttocks toward the ceiling. 5. Hold this position for 15 to 30 seconds. 6. Repeat 2 to 4 times. Follow-up care is a key part of your treatment and safety. Be sure to make and go to all appointments, and call your doctor if you are having problems. It's also a good idea to know your test results and keep a list of the medicines you take. Where can you learn more? Go to http://alisha-ximena.info/. Enter M704 in the search box to learn more about \"Acute Low Back Pain: Exercises. \"  Current as of: September 20, 2018  Content Version: 12.1  © 7124-5205 [employer]. Care instructions adapted under license by [employer] (which disclaims liability or warranty for this information). If you have questions about a medical condition or this instruction, always ask your healthcare professional. [address] any warranty or liability for your use of this information.

## 2019-08-18 NOTE — ED TRIAGE NOTES
Pt presents to ED with c/o bilateral flank pain for one week. Pt states she had vomiting earlier in the week but has not vomited for two days. Pt with c/o nausea.

## 2019-08-18 NOTE — ED PROVIDER NOTES
This patient presents with severe right flank pain. It started gradually 6 days ago. Within the past week, she also had a viral syndrome associated with vomiting that resolved. No fever or chills. No history of diabetes. No urinary retention. She was having polyuria but that has improved. No burning or pain with urination. She has had UTI and it sounds like pyelonephritis in the past.  This is different. No position that she gets in is comfortable. It does hurt to twist and move. She is a non-smoker. She is here with her . No numbness or tingling to the legs. No abdominal pain. No recent injury. Pain is severe right now. She is also very nauseated and has not tried any medication because of this. Has hx of lipomas on back bilaterally for many years            Old chart reviewed - last ED visit was 2 yrs ago due to caffeine reaction after drinking energy drinks. No CT of the abdomen/pelvis in her system.              Past Medical History:   Diagnosis Date    Anxiety     Asthma     Depression     Fluid retention     GERD (gastroesophageal reflux disease)     Hypotension     Insulin resistance     Morbid obesity (HCC)     Obstructive sleep apnea, adult     cpap    PCOS (polycystic ovarian syndrome)        Past Surgical History:   Procedure Laterality Date    HX DILATION AND CURETTAGE      s/p miscarriage 2004    HX GYN      IUD placement    HX HEENT      wisdom teeth    HX KNEE ARTHROSCOPY Bilateral          Family History:   Problem Relation Age of Onset    Asthma Mother     Heart Disease Mother         Afib    Cancer Mother         colon    Asthma Father     Asthma Sister     Asthma Brother     Headache Paternal Uncle     Diabetes Maternal Grandfather     Stroke Maternal Grandfather     Diabetes Paternal Grandfather     Stroke Paternal Grandfather        Social History     Socioeconomic History    Marital status:      Spouse name: Not on file    Number of children: 3    Years of education: Not on file    Highest education level: Not on file   Occupational History    Occupation: corrections    Social Needs    Financial resource strain: Not on file    Food insecurity:     Worry: Not on file     Inability: Not on file    Transportation needs:     Medical: Not on file     Non-medical: Not on file   Tobacco Use    Smoking status: Former Smoker     Types: Cigarettes     Last attempt to quit:      Years since quittin.6    Smokeless tobacco: Never Used   Substance and Sexual Activity    Alcohol use: Yes     Comment: Rarely used.  Drug use: No    Sexual activity: Yes     Partners: Male     Birth control/protection: Surgical     Comment: vasectomy    Lifestyle    Physical activity:     Days per week: Not on file     Minutes per session: Not on file    Stress: Not on file   Relationships    Social connections:     Talks on phone: Not on file     Gets together: Not on file     Attends Restoration service: Not on file     Active member of club or organization: Not on file     Attends meetings of clubs or organizations: Not on file     Relationship status: Not on file    Intimate partner violence:     Fear of current or ex partner: Not on file     Emotionally abused: Not on file     Physically abused: Not on file     Forced sexual activity: Not on file   Other Topics Concern    Not on file   Social History Narrative    In the home with 4 children ages 13-11     Spouse in the home     All well          ALLERGIES: Ciprofloxacin; Percocet [oxycodone-acetaminophen]; and Prozac [fluoxetine]    Review of Systems   All other systems reviewed and are negative. Vitals:    19 0937   BP: 130/74   Pulse: 89   Resp: 17   Temp: 97.8 °F (36.6 °C)   SpO2: 100%   Weight: 141.5 kg (311 lb 15.2 oz)   Height: 5' 7\" (1.702 m)            Physical Exam   Constitutional: She appears well-developed and well-nourished. She appears distressed.    crying   HENT: Head: Normocephalic. Mouth/Throat: Oropharynx is clear and moist.   Eyes: Pupils are equal, round, and reactive to light. Conjunctivae are normal.   Neck: No tracheal deviation present. Cardiovascular: Normal rate, regular rhythm and intact distal pulses. Pulmonary/Chest: Effort normal and breath sounds normal.   Abdominal: Soft. She exhibits no distension. There is no tenderness. There is no guarding. obese   Musculoskeletal: She exhibits no edema. Neurological: She is alert. She has normal strength. No sensory deficit. Minimal limping gait - mostly normal   Skin: Skin is warm and dry. She is not diaphoretic. Psychiatric: She has a normal mood and affect. MDM       Procedures               After her medication here, the patient is much improved. Pain is likely from her muscles. I gave her some stretches to perform. She will call her primary care physician tomorrow. Prescription for Flexeril and lidocaine patches. She will take over-the-counter Tylenol alternating with ibuprofen. Reviewed CT images. Doubt spine infection. Recent Results (from the past 12 hour(s))   URINALYSIS W/MICROSCOPIC    Collection Time: 08/18/19  9:44 AM   Result Value Ref Range    Color YELLOW/STRAW      Appearance CLEAR CLEAR      Specific gravity 1.020 1.003 - 1.030      pH (UA) 8.0 5.0 - 8.0      Protein NEGATIVE  NEG mg/dL    Glucose NEGATIVE  NEG mg/dL    Ketone NEGATIVE  NEG mg/dL    Bilirubin NEGATIVE  NEG      Blood SMALL (A) NEG      Urobilinogen 0.2 0.2 - 1.0 EU/dL    Nitrites NEGATIVE  NEG      Leukocyte Esterase NEGATIVE  NEG      WBC 0-4 0 - 4 /hpf    RBC 0-5 0 - 5 /hpf    Epithelial cells FEW FEW /lpf    Bacteria NEGATIVE  NEG /hpf    Amorphous Crystals FEW (A) NEG     URINE CULTURE HOLD SAMPLE    Collection Time: 08/18/19  9:44 AM   Result Value Ref Range    Urine culture hold        URINE ON HOLD IN MICROBIOLOGY DEPT FOR 3 DAYS.  IF UNPRESERVED URINE IS SUBMITTED, IT CANNOT BE USED FOR ADDITIONAL TESTING AFTER 24 HRS, RECOLLECTION WILL BE REQUIRED.    HCG URINE, QL. - POC    Collection Time: 08/18/19  9:45 AM   Result Value Ref Range    Pregnancy test,urine (POC) NEGATIVE  NEG

## 2019-08-18 NOTE — ED TRIAGE NOTES
Depression is unchanged but has only slightly improved with medication changes.   Patient on bupropion 150mg qAM and fluoxetine 20mg qPM   LUCILLE-7 and PHQ-9 as above with mild-moderate symptoms.   At this point, patient would benefit from formal evaluation by psychiatrist - will recommend evaluation by Dr. Figueroa for medication assistance.   Also recommend therapy - provided resources both covered by insurance and in patient's area on psychology today's website for her to browse. Agrees to look into this.   Continue to monitor at subsequent visits.      Patient reports being seen here this morning for flank pain. Patient reports pain has increased. Denies dysuria.

## 2019-08-18 NOTE — LETTER
[facility] EMERGENCY DEPT 
[address] [facility] [address] 
[phone] Work/School Note Date: 8/18/2019 To Whom It May concern: 
 
Heidy Aguilar was seen and treated today in the emergency room by the following provider(s): 
Attending Provider: Camilo Escobedo DO. Yvonne Grimes A [facility]  was in the ED with her. Please excuse from work 8/19/2019. May return to work on 8/20/2019. Sincerely, Herman Ponce DO

## 2019-08-18 NOTE — LETTER
[facility] EMERGENCY DEPT 
[address] [facility] [address] 
[phone] Work/School Note Date: 8/18/2019 To Whom It May concern: 
 
Heidy Aguilar was seen and treated today in the emergency room by the following provider(s): 
Attending Provider: Camilo Escobedo DO. Heidy Aguilar may return to work/school on 8/20/2019. Sincerely, Herman Ponce DO

## 2019-08-19 VITALS
OXYGEN SATURATION: 95 % | SYSTOLIC BLOOD PRESSURE: 122 MMHG | TEMPERATURE: 99.2 F | WEIGHT: 293 LBS | HEART RATE: 71 BPM | RESPIRATION RATE: 12 BRPM | BODY MASS INDEX: 49.07 KG/M2 | DIASTOLIC BLOOD PRESSURE: 73 MMHG

## 2019-08-19 NOTE — ED NOTES
Pt is resting with lights dimmed and family at bedside. Call bell within reach.  Pt has normal saline infusing via 22g in left [facility]

## 2019-08-19 NOTE — ED NOTES
The patient was discharged home by Dr. Damian Sweet and Marine Fernandez rn in stable condition, accompanied by family. The patient is alert and oriented, is in no respiratory distress and has vital signs within normal limits . The patient's diagnosis, condition and treatment were explained to patient. The patient expressed understanding. Prescriptions given to pt. No work/school note given to pt. A discharge plan has been developed. A  was not involved in the process. Aftercare instructions were given to the patient. Pt's saline lock removed without complications. Family will transport pt home.

## 2019-08-19 NOTE — ED NOTES
Pt presents with complaint of mid left abd pain x 6 days with nausea. Pt was seen in ED this am with left flank pain and discharged. Call bell within reach. Family at bedside.

## 2019-08-19 NOTE — ED PROVIDER NOTES
This is a 68-year-old female comes emergency room with chief complaint of bilateral lower back/flank pain. Patient also complains of epigastric abdominal pain. Patient states that she was seen in the emergency room earlier today. Patient had a urinalysis which showed small amount of blood in her urine and a CT of her abdomen pelvis which showed no kidney stones. Patient states that her and other family members have had a GI bug over the past week. Patient complained of burning in her epigastric area. Patient has had nausea with occasional emesis as well. Patient denies any urinary symptoms. The history is provided by the patient. No  was used. Flank Pain    This is a new problem. The current episode started more than 2 days ago. The problem has been gradually worsening. The problem occurs constantly. The pain is associated with no known injury. The pain is present in the lumbar spine, right side and left side. The quality of the pain is described as cramping. The pain is at a severity of 8/10. The pain is moderate. Associated symptoms include abdominal pain. Pertinent negatives include no chest pain, no fever, no numbness, no weight loss, no headaches, no abdominal swelling, no bowel incontinence, no bladder incontinence, no dysuria, no pelvic pain, no leg pain, no paresthesias, no paresis, no tingling and no weakness. She has tried muscle relaxants and NSAIDs for the symptoms.  The patient's surgical history non-contributory        Past Medical History:   Diagnosis Date    Anxiety     Asthma     Depression     Fluid retention     GERD (gastroesophageal reflux disease)     Hypotension     Insulin resistance     Morbid obesity (HCC)     Obstructive sleep apnea, adult     cpap    PCOS (polycystic ovarian syndrome)        Past Surgical History:   Procedure Laterality Date    HX DILATION AND CURETTAGE      s/p miscarriage 2004    HX GYN      IUD placement    HX HEENT wisdom teeth    HX KNEE ARTHROSCOPY Bilateral          Family History:   Problem Relation Age of Onset    Asthma Mother     Heart Disease Mother         Afib    Cancer Mother         colon    Asthma Father     Asthma Sister     Asthma Brother     Headache Paternal Uncle     Diabetes Maternal Grandfather     Stroke Maternal Grandfather     Diabetes Paternal Grandfather     Stroke Paternal Grandfather        Social History     Socioeconomic History    Marital status:      Spouse name: Not on file    Number of children: 3    Years of education: Not on file    Highest education level: Not on file   Occupational History    Occupation: corrections    Social Needs    Financial resource strain: Not on file    Food insecurity:     Worry: Not on file     Inability: Not on file    Transportation needs:     Medical: Not on file     Non-medical: Not on file   Tobacco Use    Smoking status: Former Smoker     Types: Cigarettes     Last attempt to quit:      Years since quittin.6    Smokeless tobacco: Never Used   Substance and Sexual Activity    Alcohol use: Yes     Comment: Rarely used.     Drug use: No    Sexual activity: Yes     Partners: Male     Birth control/protection: Surgical     Comment: vasectomy    Lifestyle    Physical activity:     Days per week: Not on file     Minutes per session: Not on file    Stress: Not on file   Relationships    Social connections:     Talks on phone: Not on file     Gets together: Not on file     Attends Je[employer]'s witness service: Not on file     Active member of club or organization: Not on file     Attends meetings of clubs or organizations: Not on file     Relationship status: Not on file    Intimate partner violence:     Fear of current or ex partner: Not on file     Emotionally abused: Not on file     Physically abused: Not on file     Forced sexual activity: Not on file   Other Topics Concern    Not on file   Social History Narrative    In the home with 4 children ages 13-11     Spouse in the home     All well      ALLERGIES: Ciprofloxacin; Percocet [oxycodone-acetaminophen]; and Prozac [fluoxetine]    Review of Systems   Constitutional: Negative for appetite change, chills, fever, unexpected weight change and weight loss. HENT: Negative for ear pain, hearing loss, rhinorrhea and trouble swallowing. Eyes: Negative for pain and visual disturbance. Respiratory: Negative for cough, chest tightness and shortness of breath. Cardiovascular: Negative for chest pain and palpitations. Gastrointestinal: Positive for abdominal pain. Negative for abdominal distention, blood in stool, bowel incontinence and vomiting. Genitourinary: Positive for flank pain. Negative for bladder incontinence, dysuria, hematuria, pelvic pain and urgency. Musculoskeletal: Negative for back pain and myalgias. Skin: Negative for rash. Neurological: Negative for dizziness, tingling, syncope, weakness, numbness, headaches and paresthesias. Psychiatric/Behavioral: Negative for confusion and suicidal ideas. All other systems reviewed and are negative. Vitals:    08/18/19 1947 08/18/19 2212   BP: 134/75 132/70   Pulse: 86 71   Resp: 16 16   Temp: 99.7 °F (37.6 °C) 99.2 °F (37.3 °C)   SpO2: 100% 98%   Weight: 142.1 kg (313 lb 4.4 oz)             Physical Exam   Constitutional: She is oriented to person, place, and time. She appears well-developed and well-nourished. No distress. HENT:   Head: Normocephalic and atraumatic. Right Ear: External ear normal.   Left Ear: External ear normal.   Nose: Nose normal.   Mouth/Throat: Oropharynx is clear and moist. No oropharyngeal exudate. Eyes: Pupils are equal, round, and reactive to light. Conjunctivae and EOM are normal. Right eye exhibits no discharge. Left eye exhibits no discharge. No scleral icterus. Neck: Normal range of motion. Neck supple. No JVD present. No tracheal deviation present.    Cardiovascular: Normal rate, regular rhythm, normal heart sounds and intact distal pulses. Exam reveals no gallop and no friction rub. No murmur heard. Pulmonary/Chest: Effort normal and breath sounds normal. No stridor. No respiratory distress. She has no decreased breath sounds. She has no wheezes. She has no rhonchi. She has no rales. She exhibits no tenderness. Abdominal: Soft. Bowel sounds are normal. She exhibits no distension. There is tenderness in the epigastric area and suprapubic area. There is no rebound and no guarding. Musculoskeletal: Normal range of motion. She exhibits no edema. Lumbar back: She exhibits tenderness. Back:    Neurological: She is alert and oriented to person, place, and time. She has normal strength and normal reflexes. She displays normal reflexes. No cranial nerve deficit or sensory deficit. She exhibits normal muscle tone. Coordination normal. GCS eye subscore is 4. GCS verbal subscore is 5. GCS motor subscore is 6. Strength is a 5 out of 5 in lower extremities. There is no motor or sensory deficits noted. Skin: Skin is warm and dry. Capillary refill takes less than 2 seconds. No rash noted. She is not diaphoretic. No erythema. No pallor. Psychiatric: She has a normal mood and affect. Her behavior is normal. Judgment and thought content normal.   Nursing note and vitals reviewed.        MDM  Number of Diagnoses or Management Options  Abdominal pain, epigastric:   Acute bilateral low back pain without sciatica:   Gastritis and duodenitis:      Amount and/or Complexity of Data Reviewed  Clinical lab tests: ordered and reviewed  Tests in the radiology section of CPT®: ordered and reviewed  Review and summarize past medical records: yes    Risk of Complications, Morbidity, and/or Mortality  Presenting problems: moderate  Diagnostic procedures: moderate  Management options: moderate    Patient Progress  Patient progress: stable         Procedures    Chief Complaint   Patient presents with    Flank Pain       The patient's presenting problems have been discussed, and they are in agreement with the care plan formulated and outlined with them. I have encouraged them to ask questions as they arise throughout their visit. MEDICATIONS GIVEN:  Medications   sodium chloride 0.9 % bolus infusion 1,000 mL ( IntraVENous Restarted 8/18/19 2154)   ondansetron (ZOFRAN) injection 4 mg (4 mg IntraVENous Given 8/18/19 2005)   famotidine (PF) (PEPCID) injection 20 mg (20 mg IntraVENous Given 8/18/19 2006)   mylanta/viscous lidocaine (GI COCKTAIL) (40 mL Oral Given 8/18/19 2118)   ondansetron (ZOFRAN ODT) tablet 4 mg (4 mg Oral Given 8/18/19 2117)   predniSONE (DELTASONE) tablet 60 mg (60 mg Oral Given 8/18/19 2118)   prochlorperazine (COMPAZINE) injection 10 mg (10 mg IntraVENous Given 8/18/19 2155)   morphine injection 2 mg (2 mg IntraVENous Given 8/18/19 2156)   methylPREDNISolone (PF) (Solu-MEDROL) injection 125 mg (125 mg IntraVENous Given 8/18/19 2210)       LABS REVIEWED:  Recent Results (from the past 24 hour(s))   URINALYSIS W/MICROSCOPIC    Collection Time: 08/18/19  9:44 AM   Result Value Ref Range    Color YELLOW/STRAW      Appearance CLEAR CLEAR      Specific gravity 1.020 1.003 - 1.030      pH (UA) 8.0 5.0 - 8.0      Protein NEGATIVE  NEG mg/dL    Glucose NEGATIVE  NEG mg/dL    Ketone NEGATIVE  NEG mg/dL    Bilirubin NEGATIVE  NEG      Blood SMALL (A) NEG      Urobilinogen 0.2 0.2 - 1.0 EU/dL    Nitrites NEGATIVE  NEG      Leukocyte Esterase NEGATIVE  NEG      WBC 0-4 0 - 4 /hpf    RBC 0-5 0 - 5 /hpf    Epithelial cells FEW FEW /lpf    Bacteria NEGATIVE  NEG /hpf    Amorphous Crystals FEW (A) NEG     URINE CULTURE HOLD SAMPLE    Collection Time: 08/18/19  9:44 AM   Result Value Ref Range    Urine culture hold        URINE ON HOLD IN MICROBIOLOGY DEPT FOR 3 DAYS. IF UNPRESERVED URINE IS SUBMITTED, IT CANNOT BE USED FOR ADDITIONAL TESTING AFTER 24 HRS, RECOLLECTION WILL BE REQUIRED.    HCG URINE, QL. - POC    Collection Time: 08/18/19  9:45 AM   Result Value Ref Range    Pregnancy test,urine (POC) NEGATIVE  NEG     CBC WITH AUTOMATED DIFF    Collection Time: 08/18/19  8:00 PM   Result Value Ref Range    WBC 9.2 3.6 - 11.0 K/uL    RBC 4.37 3.80 - 5.20 M/uL    HGB 13.5 11.5 - 16.0 g/dL    HCT 39.1 35.0 - 47.0 %    MCV 89.5 80.0 - 99.0 FL    MCH 30.9 26.0 - 34.0 PG    MCHC 34.5 30.0 - 36.5 g/dL    RDW 12.4 11.5 - 14.5 %    PLATELET 583 648 - 774 K/uL    MPV 10.3 8.9 - 12.9 FL    NEUTROPHILS 71 32 - 75 %    LYMPHOCYTES 20 12 - 49 %    MONOCYTES 8 5 - 13 %    EOSINOPHILS 1 0 - 7 %    BASOPHILS 0 0 - 1 %    ABS. NEUTROPHILS 6.5 1.8 - 8.0 K/UL    ABS. LYMPHOCYTES 1.9 0.8 - 3.5 K/UL    ABS. MONOCYTES 0.7 0.0 - 1.0 K/UL    ABS. EOSINOPHILS 0.1 0.0 - 0.4 K/UL    ABS. BASOPHILS 0.0 0.0 - 0.1 K/UL    DF AUTOMATED     METABOLIC PANEL, COMPREHENSIVE    Collection Time: 08/18/19  8:00 PM   Result Value Ref Range    Sodium 142 136 - 145 mmol/L    Potassium 4.2 3.5 - 5.1 mmol/L    Chloride 106 97 - 108 mmol/L    CO2 27 21 - 32 mmol/L    Anion gap 9 5 - 15 mmol/L    Glucose 107 (H) 65 - 100 mg/dL    BUN 14 6 - 20 MG/DL    Creatinine 0.81 0.55 - 1.02 MG/DL    BUN/Creatinine ratio 17 12 - 20      GFR est AA >60 >60 ml/min/1.73m2    GFR est non-AA >60 >60 ml/min/1.73m2    Calcium 9.0 8.5 - 10.1 MG/DL    Bilirubin, total 0.5 0.2 - 1.0 MG/DL    ALT (SGPT) 16 12 - 78 U/L    AST (SGOT) 9 (L) 15 - 37 U/L    Alk.  phosphatase 82 45 - 117 U/L    Protein, total 7.6 6.4 - 8.2 g/dL    Albumin 3.8 3.5 - 5.0 g/dL    Globulin 3.8 2.0 - 4.0 g/dL    A-G Ratio 1.0 (L) 1.1 - 2.2     LIPASE    Collection Time: 08/18/19  8:00 PM   Result Value Ref Range    Lipase 133 73 - 393 U/L       VITAL SIGNS:  Patient Vitals for the past 24 hrs:   Temp Pulse Resp BP SpO2   08/18/19 2212 99.2 °F (37.3 °C) 71 16 132/70 98 %   08/18/19 1947 99.7 °F (37.6 °C) 86 16 134/75 100 %       RADIOLOGY RESULTS:  The following have been ordered and reviewed:  Ct Abd Pelv Wo Cont    Result Date: 8/18/2019  INDICATION: microscopic hematuria. back/flank pain. COMPARISON: None CONTRAST:  None. TECHNIQUE: Thin axial images were obtained through the abdomen and pelvis. Coronal and sagittal reconstructions were generated. Oral contrast was not administered. CT dose reduction was achieved through use of a standardized protocol tailored for this examination and automatic exposure control for dose modulation. Adaptive statistical iterative reconstruction (ASIR) was utilized. The absence of intravenous contrast material reduces the sensitivity for evaluation of the solid parenchymal organs of the abdomen. FINDINGS: LUNG BASES: Clear. INCIDENTALLY IMAGED HEART AND MEDIASTINUM: Unremarkable. LIVER: No mass or biliary dilatation. GALLBLADDER: Unremarkable. SPLEEN: No mass. PANCREAS: No mass or ductal dilatation. ADRENALS: Unremarkable. KIDNEYS/URETERS: No mass, calculus, or hydronephrosis. STOMACH: Unremarkable. SMALL BOWEL: No dilatation or wall thickening. COLON: No dilatation or wall thickening. APPENDIX: Unremarkable. PERITONEUM: No ascites or pneumoperitoneum. RETROPERITONEUM: No lymphadenopathy or aortic aneurysm. REPRODUCTIVE ORGANS: Unremarkable URINARY BLADDER: No mass or calculus. BONES: No destructive bone lesion. ADDITIONAL COMMENTS: N/A     IMPRESSION: No nephrolithiasis or hydronephrosis. No evidence of acute process. [id number] [facility]    Result Date: 8/18/2019  EXAM:  US abdomen limited INDICATION: Right upper quadrant pain. COMPARISON:  CT 8/18/2019 TECHNIQUE:  Limited abdominal ultrasound. FINDINGS: Liver: Echogenicity is within normal limits. No focal liver lesion. Main portal vein flow: Toward the liver with a velocity of 30 cm/s. Fluid: No ascites. Gallbladder: Within normal limits. No gallstones. No gallbladder wall thickening or pericholecystic fluid. Bile ducts: There is no intra or extrahepatic biliary ductal dilatation. The common bile duct measures 4 mm. Pancreas:  The visualized portions are within normal limits. Kidneys: Right length: 12.7 cm. No hydronephrosis. IMPRESSION: No acute abnormality in the right upper abdomen. PROGRESS NOTES:  As the patient was initially being discharged, the patient had an episode of emesis. Patient had her IV restarted. Patient given nausea medicine patient feeling better after IV fluids. Discussed results and plan with patient and significant other. Patient will be discharged home with PCP follow up. Patient instructed to return to the emergency room for any worsening symptoms or any other concerns. DIAGNOSIS:    1. Abdominal pain, epigastric    2. Acute bilateral low back pain without sciatica    3. Gastritis and duodenitis        PLAN:  Follow-up Information     Follow up With Specialties Details Why Contact Info    Jonathan Enciso MD Family Practice Schedule an appointment as soon as possible for a visit  [address] [facility] [address] [facility] EMERGENCY DEPT Emergency Medicine  If symptoms worsen [address] [id number]  [phone]        Current Discharge Medication List      START taking these medications    Details   pantoprazole (PROTONIX) 40 mg tablet Take 1 Tab by mouth daily for 30 days. Qty: 30 Tab, Refills: 0      predniSONE (STERAPRED DS) 10 mg dose pack Take as directed. Qty: 21 Tab, Refills: 0      ondansetron (ZOFRAN ODT) 4 mg disintegrating tablet Take 1 Tab by mouth every eight (8) hours as needed for Nausea. Qty: 20 Tab, Refills: 0      HYDROcodone-acetaminophen (NORCO) 5-325 mg per tablet Take 1 Tab by mouth every six (6) hours as needed for Pain for up to 5 days. Max Daily Amount: 4 Tabs.   Qty: 10 Tab, Refills: 0    Associated Diagnoses: Acute bilateral low back pain without sciatica         CONTINUE these medications which have NOT CHANGED    Details   lidocaine (LIDODERM) 5 % Apply patch to the affected area for 12 hours a day and remove for 12 hours a day. Qty: 1 Package, Refills: 1      cyclobenzaprine (FLEXERIL) 10 mg tablet Take 1 Tab by mouth three (3) times daily as needed for Muscle Spasm(s). Qty: 20 Tab, Refills: 0      furosemide (LASIX) 20 mg tablet Take 20 mg by mouth daily. POTASSIUM PO Take 10 mEq by mouth.      biotin 2,500 mcg tab Take 5,000 mg by mouth. ED COURSE: The patient's hospital course has been uncomplicated.

## 2019-08-19 NOTE — DISCHARGE INSTRUCTIONS
We hope that we have addressed all of your medical concerns. The examination and treatment you received in the Emergency Department were for an emergent problem and were not intended as complete care. It is important that you follow up with your healthcare provider(s) for ongoing care. If your symptoms worsen or do not improve as expected, and you are unable to reach your usual health care provider(s), you should return to the Emergency Department. Today's healthcare is undergoing tremendous change, and patient satisfaction surveys are one of the many tools to assess the quality of medical care. You may receive a survey from the [employer] regarding your experience in the Emergency Department. I hope that your experience has been completely positive, particularly the medical care that I provided. As such, please participate in the survey; anything less than excellent does not meet my expectations or intentions. [facility] and [address] [place] [address] participate in nationally recognized quality of care measures. If your blood pressure is greater than 120/80, as reported below, we urge that you seek medical care to address the potential of high blood pressure, commonly known as hypertension. Hypertension can be hereditary or can be caused by certain medical conditions, pain, stress, or \"white coat syndrome. \"       Please make an appointment with your health care provider(s) for follow up of your Emergency Department visit. VITALS:   Patient Vitals for the past 8 hrs:   Temp Pulse Resp BP SpO2   08/18/19 1947 99.7 °F (37.6 °C) 86 16 134/75 100 %          Thank you for allowing us to provide you with medical care today. We realize that you have many choices for your emergency care needs. Please choose us in the future for any continued health care needs. Cari Minors  Arelis Coil, [address] Office: [phone]            Recent Results (from the past 24 hour(s))   URINALYSIS W/MICROSCOPIC    Collection Time: 08/18/19  9:44 AM   Result Value Ref Range    Color YELLOW/STRAW      Appearance CLEAR CLEAR      Specific gravity 1.020 1.003 - 1.030      pH (UA) 8.0 5.0 - 8.0      Protein NEGATIVE  NEG mg/dL    Glucose NEGATIVE  NEG mg/dL    Ketone NEGATIVE  NEG mg/dL    Bilirubin NEGATIVE  NEG      Blood SMALL (A) NEG      Urobilinogen 0.2 0.2 - 1.0 EU/dL    Nitrites NEGATIVE  NEG      Leukocyte Esterase NEGATIVE  NEG      WBC 0-4 0 - 4 /hpf    RBC 0-5 0 - 5 /hpf    Epithelial cells FEW FEW /lpf    Bacteria NEGATIVE  NEG /hpf    Amorphous Crystals FEW (A) NEG     URINE CULTURE HOLD SAMPLE    Collection Time: 08/18/19  9:44 AM   Result Value Ref Range    Urine culture hold        URINE ON HOLD IN MICROBIOLOGY DEPT FOR 3 DAYS. IF UNPRESERVED URINE IS SUBMITTED, IT CANNOT BE USED FOR ADDITIONAL TESTING AFTER 24 HRS, RECOLLECTION WILL BE REQUIRED. HCG URINE, QL. - POC    Collection Time: 08/18/19  9:45 AM   Result Value Ref Range    Pregnancy test,urine (POC) NEGATIVE  NEG     CBC WITH AUTOMATED DIFF    Collection Time: 08/18/19  8:00 PM   Result Value Ref Range    WBC 9.2 3.6 - 11.0 K/uL    RBC 4.37 3.80 - 5.20 M/uL    HGB 13.5 11.5 - 16.0 g/dL    HCT 39.1 35.0 - 47.0 %    MCV 89.5 80.0 - 99.0 FL    MCH 30.9 26.0 - 34.0 PG    MCHC 34.5 30.0 - 36.5 g/dL    RDW 12.4 11.5 - 14.5 %    PLATELET 143 463 - 781 K/uL    MPV 10.3 8.9 - 12.9 FL    NEUTROPHILS 71 32 - 75 %    LYMPHOCYTES 20 12 - 49 %    MONOCYTES 8 5 - 13 %    EOSINOPHILS 1 0 - 7 %    BASOPHILS 0 0 - 1 %    ABS. NEUTROPHILS 6.5 1.8 - 8.0 K/UL    ABS. LYMPHOCYTES 1.9 0.8 - 3.5 K/UL    ABS. MONOCYTES 0.7 0.0 - 1.0 K/UL    ABS. EOSINOPHILS 0.1 0.0 - 0.4 K/UL    ABS.  BASOPHILS 0.0 0.0 - 0.1 K/UL    DF AUTOMATED     METABOLIC PANEL, COMPREHENSIVE    Collection Time: 08/18/19  8:00 PM   Result Value Ref Range    Sodium 142 136 - 145 mmol/L    Potassium 4.2 3.5 - 5.1 mmol/L    Chloride 106 97 - 108 mmol/L    CO2 27 21 - 32 mmol/L    Anion gap 9 5 - 15 mmol/L    Glucose 107 (H) 65 - 100 mg/dL    BUN 14 6 - 20 MG/DL    Creatinine 0.81 0.55 - 1.02 MG/DL    BUN/Creatinine ratio 17 12 - 20      GFR est AA >60 >60 ml/min/1.73m2    GFR est non-AA >60 >60 ml/min/1.73m2    Calcium 9.0 8.5 - 10.1 MG/DL    Bilirubin, total 0.5 0.2 - 1.0 MG/DL    ALT (SGPT) 16 12 - 78 U/L    AST (SGOT) 9 (L) 15 - 37 U/L    Alk. phosphatase 82 45 - 117 U/L    Protein, total 7.6 6.4 - 8.2 g/dL    Albumin 3.8 3.5 - 5.0 g/dL    Globulin 3.8 2.0 - 4.0 g/dL    A-G Ratio 1.0 (L) 1.1 - 2.2     LIPASE    Collection Time: 08/18/19  8:00 PM   Result Value Ref Range    Lipase 133 73 - 393 U/L       Ct Abd Pelv Wo Cont    Result Date: 8/18/2019  INDICATION: microscopic hematuria. back/flank pain. COMPARISON: None CONTRAST:  None. TECHNIQUE: Thin axial images were obtained through the abdomen and pelvis. Coronal and sagittal reconstructions were generated. Oral contrast was not administered. CT dose reduction was achieved through use of a standardized protocol tailored for this examination and automatic exposure control for dose modulation. Adaptive statistical iterative reconstruction (ASIR) was utilized. The absence of intravenous contrast material reduces the sensitivity for evaluation of the solid parenchymal organs of the abdomen. FINDINGS: LUNG BASES: Clear. INCIDENTALLY IMAGED HEART AND MEDIASTINUM: Unremarkable. LIVER: No mass or biliary dilatation. GALLBLADDER: Unremarkable. SPLEEN: No mass. PANCREAS: No mass or ductal dilatation. ADRENALS: Unremarkable. KIDNEYS/URETERS: No mass, calculus, or hydronephrosis. STOMACH: Unremarkable. SMALL BOWEL: No dilatation or wall thickening. COLON: No dilatation or wall thickening. APPENDIX: Unremarkable. PERITONEUM: No ascites or pneumoperitoneum. RETROPERITONEUM: No lymphadenopathy or aortic aneurysm.  REPRODUCTIVE ORGANS: Unremarkable URINARY BLADDER: No mass or calculus. BONES: No destructive bone lesion. ADDITIONAL COMMENTS: N/A     IMPRESSION: No nephrolithiasis or hydronephrosis. No evidence of acute process. [address]    Result Date: 8/18/2019  EXAM:  US abdomen limited INDICATION: Right upper quadrant pain. COMPARISON:  CT 8/18/2019 TECHNIQUE:  Limited abdominal ultrasound. FINDINGS: Liver: Echogenicity is within normal limits. No focal liver lesion. Main portal vein flow: Toward the liver with a velocity of 30 cm/s. Fluid: No ascites. Gallbladder: Within normal limits. No gallstones. No gallbladder wall thickening or pericholecystic fluid. Bile ducts: There is no intra or extrahepatic biliary ductal dilatation. The common bile duct measures 4 mm. Pancreas: The visualized portions are within normal limits. Kidneys: Right length: 12.7 cm. No hydronephrosis. IMPRESSION: No acute abnormality in the right upper abdomen. Patient Education        Abdominal Pain: Care Instructions  Your Care Instructions    Abdominal pain has many possible causes. Some aren't serious and get better on their own in a few days. Others need more testing and treatment. If your pain continues or gets worse, you need to be rechecked and may need more tests to find out what is wrong. You may need surgery to correct the problem. Don't ignore new symptoms, such as fever, nausea and vomiting, urination problems, pain that gets worse, and dizziness. These may be signs of a more serious problem. Your doctor may have recommended a follow-up visit in the next 8 to 12 hours. If you are not getting better, you may need more tests or treatment. The doctor has checked you carefully, but problems can develop later. If you notice any problems or new symptoms, get medical treatment right away. Follow-up care is a key part of your treatment and safety. Be sure to make and go to all appointments, and call your doctor if you are having problems.  It's also a good idea to know your test results and keep a list of the medicines you take. How can you care for yourself at home? · Rest until you feel better. · To prevent dehydration, drink plenty of fluids, enough so that your urine is light yellow or clear like water. Choose water and other caffeine-free clear liquids until you feel better. If you have kidney, heart, or liver disease and have to limit fluids, talk with your doctor before you increase the amount of fluids you drink. · If your stomach is upset, eat mild foods, such as rice, dry toast or crackers, bananas, and applesauce. Try eating several small meals instead of two or three large ones. · Wait until 48 hours after all symptoms have gone away before you have spicy foods, alcohol, and drinks that contain caffeine. · Do not eat foods that are high in fat. · Avoid anti-inflammatory medicines such as aspirin, ibuprofen (Advil, Motrin), and naproxen (Aleve). These can cause stomach upset. Talk to your doctor if you take daily aspirin for another health problem. When should you call for help? Call 911 anytime you think you may need emergency care. For example, call if:    · You passed out (lost consciousness).     · You pass maroon or very bloody stools.     · You vomit blood or what looks like coffee grounds.     · You have new, severe belly pain.    Call your doctor now or seek immediate medical care if:    · Your pain gets worse, especially if it becomes focused in one area of your belly.     · You have a new or higher fever.     · Your stools are black and look like tar, or they have streaks of blood.     · You have unexpected vaginal bleeding.     · You have symptoms of a urinary tract infection. These may include:  ? Pain when you urinate. ? Urinating more often than usual.  ?  Blood in your urine.     · You are dizzy or lightheaded, or you feel like you may faint.    Watch closely for changes in your health, and be sure to contact your doctor if:    · You are not getting better after 1 day (24 hours). Where can you learn more? Go to http://alisha-ximena.info/. Enter K368 in the search box to learn more about \"Abdominal Pain: Care Instructions. \"  Current as of: September 23, 2018  Content Version: 12.1  © 7168-6269 [employer]. Care instructions adapted under license by [employer] (which disclaims liability or warranty for this information). If you have questions about a medical condition or this instruction, always ask your healthcare professional. [place] [address] any warranty or liability for your use of this information. Patient Education        Back Pain: Care Instructions  Your Care Instructions    Back pain has many possible causes. It is often related to problems with muscles and ligaments of the back. It may also be related to problems with the nerves, discs, or bones of the back. Moving, lifting, standing, sitting, or sleeping in an awkward way can strain the back. Sometimes you don't notice the injury until later. Arthritis is another common cause of back pain. Although it may hurt a lot, back pain usually improves on its own within several weeks. Most people recover in 12 weeks or less. Using good home treatment and being careful not to stress your back can help you feel better sooner. Follow-up care is a key part of your treatment and safety. Be sure to make and go to all appointments, and call your doctor if you are having problems. It's also a good idea to know your test results and keep a list of the medicines you take. How can you care for yourself at home? · Sit or lie in positions that are most comfortable and reduce your pain. Try one of these positions when you lie down:  ? Lie on your back with your knees bent and supported by large pillows. ? Lie on the floor with your legs on the seat of a sofa or chair. ? Lie on your side with your knees and hips bent and a pillow between your legs.   ? Lie on your stomach if it does not make pain worse. · Do not sit up in bed, and avoid soft couches and twisted positions. Bed rest can help relieve pain at first, but it delays healing. Avoid bed rest after the first day of back pain. · Change positions every 30 minutes. If you must sit for long periods of time, take breaks from sitting. Get up and walk around, or lie in a comfortable position. · Try using a heating pad on a low or medium setting for 15 to 20 minutes every 2 or 3 hours. Try a warm shower in place of one session with the heating pad. · You can also try an ice pack for 10 to 15 minutes every 2 to 3 hours. Put a thin cloth between the ice pack and your skin. · Take pain medicines exactly as directed. ? If the doctor gave you a prescription medicine for pain, take it as prescribed. ? If you are not taking a prescription pain medicine, ask your doctor if you can take an over-the-counter medicine. · Take short walks several times a day. You can start with 5 to 10 minutes, 3 or 4 times a day, and work up to longer walks. Walk on level surfaces and avoid hills and stairs until your back is better. · Return to work and other activities as soon as you can. Continued rest without activity is usually not good for your back. · To prevent future back pain, do exercises to stretch and strengthen your back and stomach. Learn how to use good posture, safe lifting techniques, and proper body mechanics. When should you call for help? Call your doctor now or seek immediate medical care if:    · You have new or worsening numbness in your legs.     · You have new or worsening weakness in your legs. (This could make it hard to stand up.)     · You lose control of your bladder or bowels.    Watch closely for changes in your health, and be sure to contact your doctor if:    · You have a fever, lose weight, or don't feel well.     · You do not get better as expected. Where can you learn more?   Go to http://alisha-ximena.info/. Enter U530 in the search box to learn more about \"Back Pain: Care Instructions. \"  Current as of: September 20, 2018  Content Version: 12.1  © 3641-5143 [employer]. Care instructions adapted under license by [employer] (which disclaims liability or warranty for this information). If you have questions about a medical condition or this instruction, always ask your healthcare professional. [place] [address] any warranty or liability for your use of this information. Patient Education        Learning About Relief for Back Pain  What is back tension and strain? Back strain happens when you overstretch, or pull, a muscle in your back. You may hurt your back in an accident or when you exercise or lift something. Most back pain will get better with rest and time. You can take care of yourself at home to help your back heal.  What can you do first to relieve back pain? When you first feel back pain, try these steps:  · Walk. Take a short walk (10 to 20 minutes) on a level surface (no slopes, hills, or stairs) every 2 to 3 hours. Walk only distances you can manage without pain, especially leg pain. · Relax. Find a comfortable position for rest. Some people are comfortable on the floor or a medium-firm bed with a small pillow under their head and another under their knees. Some people prefer to lie on their side with a pillow between their knees. Don't stay in one position for too long. · Try heat or ice. Try using a heating pad on a low or medium setting, or take a warm shower, for 15 to 20 minutes every 2 to 3 hours. Or you can buy single-use heat wraps that last up to 8 hours. You can also try an ice pack for 10 to 15 minutes every 2 to 3 hours. You can use an ice pack or a bag of frozen vegetables wrapped in a thin towel.  There is not strong evidence that either heat or ice will help, but you can try them to see if they help. You may also want to try switching between heat and cold. · Take pain medicine exactly as directed. ¨ If the doctor gave you a prescription medicine for pain, take it as prescribed. ¨ If you are not taking a prescription pain medicine, ask your doctor if you can take an over-the-counter medicine. What else can you do? · Stretch and exercise. Exercises that increase flexibility may relieve your pain and make it easier for your muscles to keep your spine in a good, neutral position. And don't forget to keep walking. · Do self-massage. You can use self-massage to unwind after work or school or to energize yourself in the morning. You can easily massage your feet, hands, or neck. Self-massage works best if you are in comfortable clothes and are sitting or lying in a comfortable position. Use oil or lotion to massage bare skin. · Reduce stress. Back pain can lead to a vicious [place]: Distress about the pain tenses the muscles in your back, which in turn causes more pain. Learn how to relax your mind and your muscles to lower your stress. Where can you learn more? Go to http://alishaFilmijobximena.info/. Enter X618 in the search box to learn more about \"Learning About Relief for Back Pain. \"  Current as of: March 21, 2017  Content Version: 11.5  © 5484-7805 [employer]. Care instructions adapted under license by [employer] (which disclaims liability or warranty for this information). If you have questions about a medical condition or this instruction, always ask your healthcare professional. [place] [address] any warranty or liability for your use of this information. Patient Education        Low Back Pain: Exercises  Introduction  Here are some examples of exercises for you to try. The exercises may be suggested for a condition or for rehabilitation. Start each exercise slowly. Ease off the exercises if you start to have pain.   You will be told when to start these exercises and which ones will work best for you. How to do the exercises  Press-up    1. Lie on your stomach, supporting your body with your forearms. 2. Press your elbows down into the floor to raise your upper back. As you do this, relax your stomach muscles and allow your back to arch without using your back muscles. As your press up, do not let your hips or pelvis come off the floor. 3. Hold for 15 to 30 seconds, then relax. 4. Repeat 2 to 4 times. Alternate arm and leg (bird dog) exercise    1. Start on the floor, on your hands and knees. 2. Tighten your belly muscles. 3. Raise one leg off the floor, and hold it straight out behind you. Be careful not to let your hip drop down, because that will twist your trunk. 4. Hold for about 6 seconds, then lower your leg and switch to the other leg. 5. Repeat 8 to 12 times on each leg. 6. Over time, work up to holding for 10 to 30 seconds each time. 7. If you feel stable and secure with your leg raised, try raising the opposite arm straight out in front of you at the same time. Knee-to-chest exercise    1. Lie on your back with your knees bent and your feet flat on the floor. 2. Bring one knee to your chest, keeping the other foot flat on the floor (or keeping the other leg straight, whichever feels better on your lower back). 3. Keep your lower back pressed to the floor. Hold for at least 15 to 30 seconds. 4. Relax, and lower the knee to the starting position. 5. Repeat with the other leg. Repeat 2 to 4 times with each leg. 6. To get more stretch, put your other leg flat on the floor while pulling your knee to your chest.    Curl-ups    1. Lie on the floor on your back with your knees bent at a 90-degree angle. Your feet should be flat on the floor, about 12 inches from your buttocks.   2. Cross your arms over your chest. If this bothers your neck, try putting your hands behind your neck (not your head), with your elbows spread apart.  3. Slowly tighten your belly muscles and raise your shoulder blades off the floor. 4. Keep your head in line with your body, and do not press your chin to your chest.  5. Hold this position for 1 or 2 seconds, then slowly lower yourself back down to the floor. 6. Repeat 8 to 12 times. Pelvic tilt exercise    1. Lie on your back with your knees bent. 2. \"Brace\" your stomach. This means to tighten your muscles by pulling in and imagining your belly button moving toward your spine. You should feel like your back is pressing to the floor and your hips and pelvis are rocking back. 3. Hold for about 6 seconds while you breathe smoothly. 4. Repeat 8 to 12 times. Heel dig bridging    1. Lie on your back with both knees bent and your ankles bent so that only your heels are digging into the floor. Your knees should be bent about 90 degrees. 2. Then push your heels into the floor, squeeze your buttocks, and lift your hips off the floor until your shoulders, hips, and knees are all in a straight line. 3. Hold for about 6 seconds as you continue to breathe normally, and then slowly lower your hips back down to the floor and rest for up to 10 seconds. 4. Do 8 to 12 repetitions. Hamstring stretch in doorway    1. Lie on your back in a doorway, with one leg through the open door. 2. Slide your leg up the wall to straighten your knee. You should feel a gentle stretch down the back of your leg. 3. Hold the stretch for at least 15 to 30 seconds. Do not arch your back, point your toes, or bend either knee. Keep one heel touching the floor and the other heel touching the wall. 4. Repeat with your other leg. 5. Do 2 to 4 times for each leg. Hip flexor stretch    1. Kneel on the floor with one knee bent and one leg behind you. Place your forward knee over your foot. Keep your other knee touching the floor.   2. Slowly push your hips forward until you feel a stretch in the upper thigh of your rear leg.  3. Hold the stretch for at least 15 to 30 seconds. Repeat with your other leg. 4. Do 2 to 4 times on each side. Wall sit    1. Stand with your back 10 to 12 inches away from a wall. 2. Lean into the wall until your back is flat against it. 3. Slowly slide down until your knees are slightly bent, pressing your lower back into the wall. 4. Hold for about 6 seconds, then slide back up the wall. 5. Repeat 8 to 12 times. Follow-up care is a key part of your treatment and safety. Be sure to make and go to all appointments, and call your doctor if you are having problems. It's also a good idea to know your test results and keep a list of the medicines you take. Where can you learn more? Go to http://alisha-ximena.info/. Enter Z491 in the search box to learn more about \"Low Back Pain: Exercises. \"  Current as of: September 20, 2018  Content Version: 12.1  © 4899-9439 [employer]. Care instructions adapted under license by [employer] (which disclaims liability or warranty for this information). If you have questions about a medical condition or this instruction, always ask your healthcare professional. [place] [address] any warranty or liability for your use of this information. Patient Education        Gastritis: Care Instructions  Your Care Instructions    Gastritis is a sore and upset stomach. It happens when something irritates the stomach lining. Many things can cause it. These include an infection such as the flu or something you ate or drank. Medicines or a sore on the lining of the stomach (ulcer) also can cause it. Your belly may bloat and ache. You may belch, vomit, and feel sick to your stomach. You should be able to relieve the problem by taking medicine. And it may help to change your diet. If gastritis lasts, your doctor may prescribe medicine. Follow-up care is a key part of your treatment and safety.  Be sure to make and go to all appointments, and call your doctor if you are having problems. It's also a good idea to know your test results and keep a list of the medicines you take. How can you care for yourself at home? · If your doctor prescribed antibiotics, take them as directed. Do not stop taking them just because you feel better. You need to take the full course of antibiotics. · Be safe with medicines. If your doctor prescribed medicine to decrease stomach acid, take it as directed. Call your doctor if you think you are having a problem with your medicine. · Do not take any other medicine, including over-the-counter pain relievers, without talking to your doctor first.  · If your doctor recommends over-the-counter medicine to reduce stomach acid, such as Pepcid AC, Prilosec, Tagamet HB, or Zantac 75, follow the directions on the label. · Drink plenty of fluids (enough so that your urine is light yellow or clear like water) to prevent dehydration. Choose water and other caffeine-free clear liquids. If you have kidney, heart, or liver disease and have to limit fluids, talk with your doctor before you increase the amount of fluids you drink. · Limit how much alcohol you drink. · Avoid coffee, tea, cola drinks, chocolate, and other foods with caffeine. They increase stomach acid. When should you call for help? Call 911 anytime you think you may need emergency care. For example, call if:    · You vomit blood or what looks like coffee grounds.     · You pass maroon or very bloody stools.    Call your doctor now or seek immediate medical care if:    · You start breathing fast and have not produced urine in the last 8 hours.     · You cannot keep fluids down.    Watch closely for changes in your health, and be sure to contact your doctor if:    · You do not get better as expected. Where can you learn more? Go to http://alisha-ximena.info/.   Enter [id number] in the search box to learn more about \"Gastritis: Care Instructions. \"  Current as of: November 7, 2018  Content Version: 12.1  © 0324-7654 [employer]. Care instructions adapted under license by [employer] (which disclaims liability or warranty for this information). If you have questions about a medical condition or this instruction, always ask your healthcare professional. [place] [address] any warranty or liability for your use of this information.

## 2019-08-19 NOTE — ED NOTES
Bedside, Verbal and Written shift change report given to Gricelda Min by Malina Dhillon. Report included the following information SBAR, Kardex, ED Summary, [facility] and Recent Results.

## 2019-08-19 NOTE — ED NOTES
Assumed care of patient. Patient resting in bed with eyes closed. IV fluids infusing. Patient awaiting her  to return prior to discharge.

## 2019-08-19 NOTE — ED NOTES
The patient was discharged home by Dr. Flora Parker in stable condition. The patient is alert and oriented, in no respiratory distress and discharge vital signs obtained. The patient's diagnosis, condition and treatment were explained. The patient expressed understanding. A discharge plan has been developed. Aftercare instructions were given. Pt ambulatory out of the ED.

## 2022-03-19 PROBLEM — E28.2 PCOS (POLYCYSTIC OVARIAN SYNDROME): Status: ACTIVE | Noted: 2018-03-23

## 2022-03-19 PROBLEM — E66.01 OBESITY, MORBID (HCC): Status: ACTIVE | Noted: 2018-03-22

## 2023-05-16 ENCOUNTER — HOSPITAL ENCOUNTER (OUTPATIENT)
Facility: HOSPITAL | Age: 38
Setting detail: OUTPATIENT SURGERY
Discharge: HOME | End: 2023-05-16
Attending: INTERNAL MEDICINE | Admitting: INTERNAL MEDICINE
Payer: COMMERCIAL

## 2023-05-16 VITALS
HEIGHT: 67 IN | DIASTOLIC BLOOD PRESSURE: 84 MMHG | SYSTOLIC BLOOD PRESSURE: 149 MMHG | HEART RATE: 72 BPM | RESPIRATION RATE: 16 BRPM | OXYGEN SATURATION: 100 % | BODY MASS INDEX: 42.69 KG/M2 | WEIGHT: 272 LBS

## 2023-05-16 PROCEDURE — 7100000010 HC PHASE II RECOVERY - FIRST 15 MIN: Performed by: INTERNAL MEDICINE

## 2023-05-16 PROCEDURE — 2709999900 HC NON-CHARGEABLE SUPPLY: Performed by: INTERNAL MEDICINE

## 2023-05-16 PROCEDURE — 7100000000 HC PACU RECOVERY - FIRST 15 MIN: Performed by: INTERNAL MEDICINE

## 2023-05-16 PROCEDURE — C1726 CATH, BAL DIL, NON-VASCULAR: HCPCS | Performed by: INTERNAL MEDICINE

## 2023-05-16 PROCEDURE — 3600007512: Performed by: INTERNAL MEDICINE

## 2023-05-16 PROCEDURE — 3600007502: Performed by: INTERNAL MEDICINE

## 2023-05-16 RX ORDER — DICYCLOMINE HYDROCHLORIDE 10 MG/1
CAPSULE ORAL
COMMUNITY
Start: 2023-02-25

## 2023-05-16 RX ORDER — PANTOPRAZOLE SODIUM 40 MG/1
TABLET, DELAYED RELEASE ORAL
COMMUNITY
Start: 2023-05-09

## 2023-05-16 RX ORDER — ALBUTEROL SULFATE 90 UG/1
AEROSOL, METERED RESPIRATORY (INHALATION)
COMMUNITY
Start: 2022-10-21

## 2023-05-16 RX ORDER — SEMAGLUTIDE 0.5 MG/.5ML
INJECTION, SOLUTION SUBCUTANEOUS
COMMUNITY
Start: 2023-04-10

## 2023-05-16 RX ORDER — ONDANSETRON 4 MG/1
TABLET, ORALLY DISINTEGRATING ORAL
COMMUNITY
Start: 2023-05-12

## 2023-05-16 RX ORDER — FUROSEMIDE 20 MG/1
20 TABLET ORAL DAILY
COMMUNITY
Start: 2017-07-09

## 2023-05-16 RX ORDER — METHYLPHENIDATE HYDROCHLORIDE 10 MG/1
TABLET ORAL
COMMUNITY
Start: 2022-08-22

## 2023-05-16 RX ORDER — BUMETANIDE 2 MG/1
2 TABLET ORAL
COMMUNITY

## 2023-05-16 ASSESSMENT — PAIN - FUNCTIONAL ASSESSMENT: PAIN_FUNCTIONAL_ASSESSMENT: NONE - DENIES PAIN

## 2023-05-16 NOTE — PROGRESS NOTES
Rectal exam done by Betty Rodriguez RN. Anal manometry catheter inserted into rectum. Manometry procedure complete. Catheter inserted into rectum. Balloon filled with 40cc of luke warm H2O, and pt escorted to bathroom for 5 min expulsion test.  Pt was not able to expel balloon. Balloon deflated and catheter removed. Pt tolerated procedures well.

## 2023-05-16 NOTE — DISCHARGE INSTRUCTIONS
Deb Veliz  [id number]  1985      MANOMETRY DISCHARGE INSTRUCTION    You may resume your regular diet as tolerated. You may resume your normal daily activities. Call your Physician if you have any complications or questions. Discussed with the patient and all questioned fully answered. She will call me if any problems arise.

## 2023-07-08 ENCOUNTER — HOSPITAL ENCOUNTER (EMERGENCY)
Facility: HOSPITAL | Age: 38
Discharge: HOME | End: 2023-07-08
Attending: EMERGENCY MEDICINE
Payer: COMMERCIAL

## 2023-07-08 ENCOUNTER — APPOINTMENT (OUTPATIENT)
Facility: HOSPITAL | Age: 38
End: 2023-07-08
Payer: COMMERCIAL

## 2023-07-08 VITALS
TEMPERATURE: 97.8 F | WEIGHT: 272.05 LBS | DIASTOLIC BLOOD PRESSURE: 74 MMHG | BODY MASS INDEX: 42.7 KG/M2 | HEIGHT: 67 IN | SYSTOLIC BLOOD PRESSURE: 120 MMHG | OXYGEN SATURATION: 100 % | RESPIRATION RATE: 20 BRPM | HEART RATE: 94 BPM

## 2023-07-08 DIAGNOSIS — R10.84 GENERALIZED ABDOMINAL PAIN: Primary | ICD-10-CM

## 2023-07-08 DIAGNOSIS — R11.0 NAUSEA: ICD-10-CM

## 2023-07-08 LAB
ALBUMIN SERPL-MCNC: 3.8 G/DL (ref 3.5–5)
ALBUMIN/GLOB SERPL: 1.1 (ref 1.1–2.2)
ALP SERPL-CCNC: 81 U/L (ref 45–117)
ALT SERPL-CCNC: 16 U/L (ref 12–78)
ANION GAP SERPL CALC-SCNC: 11 MMOL/L (ref 5–15)
APPEARANCE UR: CLEAR
AST SERPL-CCNC: 8 U/L (ref 15–37)
BACTERIA URNS QL MICRO: ABNORMAL /HPF
BASOPHILS # BLD: 0 K/UL (ref 0–0.1)
BASOPHILS NFR BLD: 1 % (ref 0–1)
BILIRUB SERPL-MCNC: 0.6 MG/DL (ref 0.2–1)
BILIRUB UR QL: NEGATIVE
BUN SERPL-MCNC: 14 MG/DL (ref 6–20)
BUN/CREAT SERPL: 17 (ref 12–20)
CALCIUM SERPL-MCNC: 9.2 MG/DL (ref 8.5–10.1)
CHLORIDE SERPL-SCNC: 104 MMOL/L (ref 97–108)
CO2 SERPL-SCNC: 26 MMOL/L (ref 21–32)
COLOR UR: ABNORMAL
CREAT SERPL-MCNC: 0.83 MG/DL (ref 0.55–1.02)
DIFFERENTIAL METHOD BLD: NORMAL
EOSINOPHIL # BLD: 0.4 K/UL (ref 0–0.4)
EOSINOPHIL NFR BLD: 6 % (ref 0–7)
EPITH CASTS URNS QL MICRO: ABNORMAL /LPF
ERYTHROCYTE [DISTWIDTH] IN BLOOD BY AUTOMATED COUNT: 13 % (ref 11.5–14.5)
GLOBULIN SER CALC-MCNC: 3.5 G/DL (ref 2–4)
GLUCOSE SERPL-MCNC: 91 MG/DL (ref 65–100)
GLUCOSE UR STRIP.AUTO-MCNC: NEGATIVE MG/DL
HCT VFR BLD AUTO: 37 % (ref 35–47)
HGB BLD-MCNC: 12.1 G/DL (ref 11.5–16)
HGB UR QL STRIP: NEGATIVE
IMM GRANULOCYTES # BLD AUTO: 0 K/UL (ref 0–0.04)
IMM GRANULOCYTES NFR BLD AUTO: 0 % (ref 0–0.5)
KETONES UR QL STRIP.AUTO: ABNORMAL MG/DL
LEUKOCYTE ESTERASE UR QL STRIP.AUTO: NEGATIVE
LIPASE SERPL-CCNC: 264 U/L (ref 73–393)
LYMPHOCYTES # BLD: 2.1 K/UL (ref 0.8–3.5)
LYMPHOCYTES NFR BLD: 30 % (ref 12–49)
MCH RBC QN AUTO: 30.3 PG (ref 26–34)
MCHC RBC AUTO-ENTMCNC: 32.7 G/DL (ref 30–36.5)
MCV RBC AUTO: 92.7 FL (ref 80–99)
MONOCYTES # BLD: 0.5 K/UL (ref 0–1)
MONOCYTES NFR BLD: 6 % (ref 5–13)
MUCOUS THREADS URNS QL MICRO: ABNORMAL /LPF
NEUTS SEG # BLD: 4.2 K/UL (ref 1.8–8)
NEUTS SEG NFR BLD: 57 % (ref 32–75)
NITRITE UR QL STRIP.AUTO: NEGATIVE
NRBC # BLD: 0 K/UL (ref 0–0.01)
NRBC BLD-RTO: 0 PER 100 WBC
PH UR STRIP: 6 (ref 5–8)
PLATELET # BLD AUTO: 263 K/UL (ref 150–400)
PMV BLD AUTO: 10.1 FL (ref 8.9–12.9)
POTASSIUM SERPL-SCNC: 3.8 MMOL/L (ref 3.5–5.1)
PROT SERPL-MCNC: 7.3 G/DL (ref 6.4–8.2)
PROT UR STRIP-MCNC: ABNORMAL MG/DL
RBC # BLD AUTO: 3.99 M/UL (ref 3.8–5.2)
RBC #/AREA URNS HPF: ABNORMAL /HPF (ref 0–5)
SODIUM SERPL-SCNC: 141 MMOL/L (ref 136–145)
SP GR UR REFRACTOMETRY: 1.02 (ref 1–1.03)
URINE CULTURE IF INDICATED: ABNORMAL
UROBILINOGEN UR QL STRIP.AUTO: 0.2 EU/DL (ref 0.2–1)
WBC # BLD AUTO: 7.2 K/UL (ref 3.6–11)
WBC URNS QL MICRO: ABNORMAL /HPF (ref 0–4)

## 2023-07-08 PROCEDURE — 96374 THER/PROPH/DIAG INJ IV PUSH: CPT

## 2023-07-08 PROCEDURE — 80053 COMPREHEN METABOLIC PANEL: CPT

## 2023-07-08 PROCEDURE — 81001 URINALYSIS AUTO W/SCOPE: CPT

## 2023-07-08 PROCEDURE — 36415 COLL VENOUS BLD VENIPUNCTURE: CPT

## 2023-07-08 PROCEDURE — 99285 EMERGENCY DEPT VISIT HI MDM: CPT

## 2023-07-08 PROCEDURE — 6360000004 HC RX CONTRAST MEDICATION: Performed by: EMERGENCY MEDICINE

## 2023-07-08 PROCEDURE — 85025 COMPLETE CBC W/AUTO DIFF WBC: CPT

## 2023-07-08 PROCEDURE — 6370000000 HC RX 637 (ALT 250 FOR IP): Performed by: EMERGENCY MEDICINE

## 2023-07-08 PROCEDURE — 6360000002 HC RX W HCPCS: Performed by: EMERGENCY MEDICINE

## 2023-07-08 PROCEDURE — 74177 CT ABD & PELVIS W/CONTRAST: CPT

## 2023-07-08 PROCEDURE — 83690 ASSAY OF LIPASE: CPT

## 2023-07-08 RX ORDER — GABAPENTIN 100 MG/1
100 CAPSULE ORAL 2 TIMES DAILY
COMMUNITY
Start: 2023-06-23

## 2023-07-08 RX ORDER — KETOROLAC TROMETHAMINE 30 MG/ML
30 INJECTION, SOLUTION INTRAMUSCULAR; INTRAVENOUS
Status: COMPLETED | OUTPATIENT
Start: 2023-07-08 | End: 2023-07-08

## 2023-07-08 RX ORDER — HYDROMORPHONE HYDROCHLORIDE 2 MG/1
2 TABLET ORAL
COMMUNITY
Start: 2023-06-23

## 2023-07-08 RX ORDER — KETOROLAC TROMETHAMINE 15 MG/ML
30 INJECTION, SOLUTION INTRAMUSCULAR; INTRAVENOUS
Status: DISCONTINUED | OUTPATIENT
Start: 2023-07-08 | End: 2023-07-08

## 2023-07-08 RX ORDER — CYCLOBENZAPRINE HCL 10 MG
10 TABLET ORAL DAILY
COMMUNITY
Start: 2023-06-10

## 2023-07-08 RX ADMIN — IOPAMIDOL 100 ML: 755 INJECTION, SOLUTION INTRAVENOUS at 17:37

## 2023-07-08 RX ADMIN — KETOROLAC TROMETHAMINE 30 MG: 30 INJECTION, SOLUTION INTRAMUSCULAR; INTRAVENOUS at 18:04

## 2023-07-08 RX ADMIN — ALUMINUM HYDROXIDE, MAGNESIUM HYDROXIDE, DIMETHICONE 40 ML: 400; 400; 40 SUSPENSION ORAL at 18:17

## 2023-07-08 ASSESSMENT — LIFESTYLE VARIABLES
HOW MANY STANDARD DRINKS CONTAINING ALCOHOL DO YOU HAVE ON A TYPICAL DAY: PATIENT DOES NOT DRINK
HOW OFTEN DO YOU HAVE A DRINK CONTAINING ALCOHOL: NEVER

## 2023-07-08 ASSESSMENT — PAIN SCALES - GENERAL
PAINLEVEL_OUTOF10: 5
PAINLEVEL_OUTOF10: 8

## 2023-07-08 ASSESSMENT — PAIN DESCRIPTION - DESCRIPTORS: DESCRIPTORS: ACHING

## 2023-07-08 ASSESSMENT — PAIN DESCRIPTION - LOCATION
LOCATION: ABDOMEN
LOCATION: ABDOMEN

## 2023-07-08 ASSESSMENT — PAIN DESCRIPTION - PAIN TYPE: TYPE: ACUTE PAIN

## 2023-07-08 ASSESSMENT — PAIN - FUNCTIONAL ASSESSMENT: PAIN_FUNCTIONAL_ASSESSMENT: 0-10

## 2023-07-08 ASSESSMENT — PAIN DESCRIPTION - FREQUENCY: FREQUENCY: INTERMITTENT

## 2023-07-08 ASSESSMENT — PAIN DESCRIPTION - ORIENTATION: ORIENTATION: RIGHT;MID;LOWER

## 2023-07-08 NOTE — ED TRIAGE NOTES
PT c/o abdominal pain right sided x 4 days intermittent Pt states \" I am extra gassy and it smells horrible. \" Pt denied blood in stool and urine; denied urinary SX.  Pain 7/10 at its worse sharp in nature, started out as a constant burning ache

## 2023-07-08 NOTE — ED PROVIDER NOTES
[facility] EMERGENCY DEPT  EMERGENCY DEPARTMENT ENCOUNTER      Pt Name: Arielle Leger  MRN: [id number]  [address] [facility] [place] 1985  Date of evaluation: 7/8/2023  Provider: Jhoan Smith       Chief Complaint   Patient presents with    Abdominal Pain         HISTORY OF PRESENT ILLNESS   (Location/Symptom, Timing/Onset, Context/Setting, Quality, Duration, Modifying Factors, Severity)  Note limiting factors. 64-year-old female comes in with abdominal pain. Patient states that she has had vague abdominal discomfort for the last days. Started in the upper abdominal that was left-sided not really is just on the right side right lower greater than right upper. Nausea without vomiting. No diarrhea or constipation. No vaginal complaints or dysuria. She denies any other complaints    History of hysterectomy. She is on Linzess for IBS-C          Review of External Medical Records:     Nursing Notes were reviewed. REVIEW OF SYSTEMS    (2-9 systems for level 4, 10 or more for level 5)     Review of Systems    Except as noted above the remainder of the review of systems was reviewed and negative.        PAST MEDICAL HISTORY     Past Medical History:   Diagnosis Date    Anxiety     Asthma     Depression     Fluid retention     GERD (gastroesophageal reflux disease)     Hypotension     Insulin resistance     Morbid obesity (HCC)     Obstructive sleep apnea, adult     cpap    PCOS (polycystic ovarian syndrome)          SURGICAL HISTORY       Past Surgical History:   Procedure Laterality Date    DILATION AND CURETTAGE OF UTERUS      s/p miscarriage 2004    GYN      IUD placement    HEENT      wisdom teeth    KNEE ARTHROSCOPY Bilateral     PARTIAL HYSTERECTOMY (CERVIX NOT REMOVED)      RECTAL SURGERY N/A 5/16/2023    ANAL RECTAL MANOMETRY with balloon expulsion performed by Salina Yanez MD at [facility] ENDOSCOPY         CURRENT MEDICATIONS       Previous Medications    ALBUTEROL SULFATE HFA

## 2023-07-08 NOTE — ED NOTES
Pt dcd to home, verbalized understanding  of dc instructions, and FU.      Mady Swanson, RN  07/08/23 [id number]

## (undated) DEVICE — CATHETER BALLOON DIL 26X48 MMX60 CM ANORECT BAROSTAT

## (undated) DEVICE — BASIN EMESIS 500CC GRAY 250/CS 60/PLT: Brand: MEDEGEN MEDICAL PRODUCTS, LLC

## (undated) DEVICE — SHEATH CATH ANORECT MNOMTR

## (undated) DEVICE — CUFF BLD PRSS AD CLTH SGL TB W/ BAYNT CONN ROUNDED CORNER

## (undated) DEVICE — MUI SCIENTIFIC BALLOON SR1B

## (undated) DEVICE — SC 3W LP FM OFF W - PG: Brand: NAMIC